# Patient Record
Sex: MALE | Race: BLACK OR AFRICAN AMERICAN | URBAN - METROPOLITAN AREA
[De-identification: names, ages, dates, MRNs, and addresses within clinical notes are randomized per-mention and may not be internally consistent; named-entity substitution may affect disease eponyms.]

---

## 2023-11-17 ENCOUNTER — TELEPHONE (OUTPATIENT)
Age: 48
End: 2023-11-17

## 2023-11-17 NOTE — TELEPHONE ENCOUNTER
Attempted to call patient and confirm appointment for 11/20/23 with Dr. Mery Becker. No answer, left a voicemail for a returned call.

## 2023-11-20 ENCOUNTER — OFFICE VISIT (OUTPATIENT)
Age: 48
End: 2023-11-20
Payer: MEDICAID

## 2023-11-20 VITALS
HEART RATE: 77 BPM | WEIGHT: 307 LBS | TEMPERATURE: 98.7 F | SYSTOLIC BLOOD PRESSURE: 150 MMHG | RESPIRATION RATE: 20 BRPM | HEIGHT: 73 IN | DIASTOLIC BLOOD PRESSURE: 83 MMHG | OXYGEN SATURATION: 98 % | BODY MASS INDEX: 40.69 KG/M2

## 2023-11-20 DIAGNOSIS — E13.9 OTHER SPECIFIED DIABETES MELLITUS WITHOUT COMPLICATION, WITH LONG-TERM CURRENT USE OF INSULIN (HCC): ICD-10-CM

## 2023-11-20 DIAGNOSIS — Z79.4 OTHER SPECIFIED DIABETES MELLITUS WITHOUT COMPLICATION, WITH LONG-TERM CURRENT USE OF INSULIN (HCC): ICD-10-CM

## 2023-11-20 DIAGNOSIS — E66.01 MORBID OBESITY (HCC): Primary | ICD-10-CM

## 2023-11-20 DIAGNOSIS — E78.5 HYPERLIPIDEMIA, UNSPECIFIED HYPERLIPIDEMIA TYPE: ICD-10-CM

## 2023-11-20 DIAGNOSIS — E66.01 MORBID OBESITY (HCC): ICD-10-CM

## 2023-11-20 PROCEDURE — 3077F SYST BP >= 140 MM HG: CPT | Performed by: SURGERY

## 2023-11-20 PROCEDURE — 3078F DIAST BP <80 MM HG: CPT | Performed by: SURGERY

## 2023-11-20 PROCEDURE — 99204 OFFICE O/P NEW MOD 45 MIN: CPT | Performed by: SURGERY

## 2023-11-20 PROCEDURE — 3052F HG A1C>EQUAL 8.0%<EQUAL 9.0%: CPT | Performed by: SURGERY

## 2023-11-20 ASSESSMENT — PATIENT HEALTH QUESTIONNAIRE - PHQ9
SUM OF ALL RESPONSES TO PHQ QUESTIONS 1-9: 15
4. FEELING TIRED OR HAVING LITTLE ENERGY: 3
7. TROUBLE CONCENTRATING ON THINGS, SUCH AS READING THE NEWSPAPER OR WATCHING TELEVISION: 3
1. LITTLE INTEREST OR PLEASURE IN DOING THINGS: 3
6. FEELING BAD ABOUT YOURSELF - OR THAT YOU ARE A FAILURE OR HAVE LET YOURSELF OR YOUR FAMILY DOWN: 0
SUM OF ALL RESPONSES TO PHQ QUESTIONS 1-9: 15
5. POOR APPETITE OR OVEREATING: 2
SUM OF ALL RESPONSES TO PHQ QUESTIONS 1-9: 15
SUM OF ALL RESPONSES TO PHQ9 QUESTIONS 1 & 2: 5
8. MOVING OR SPEAKING SO SLOWLY THAT OTHER PEOPLE COULD HAVE NOTICED. OR THE OPPOSITE, BEING SO FIGETY OR RESTLESS THAT YOU HAVE BEEN MOVING AROUND A LOT MORE THAN USUAL: 0
2. FEELING DOWN, DEPRESSED OR HOPELESS: 2
9. THOUGHTS THAT YOU WOULD BE BETTER OFF DEAD, OR OF HURTING YOURSELF: 0
3. TROUBLE FALLING OR STAYING ASLEEP: 2
SUM OF ALL RESPONSES TO PHQ QUESTIONS 1-9: 15
10. IF YOU CHECKED OFF ANY PROBLEMS, HOW DIFFICULT HAVE THESE PROBLEMS MADE IT FOR YOU TO DO YOUR WORK, TAKE CARE OF THINGS AT HOME, OR GET ALONG WITH OTHER PEOPLE: 0

## 2023-11-28 ENCOUNTER — OFFICE VISIT (OUTPATIENT)
Age: 48
End: 2023-11-28

## 2023-11-28 DIAGNOSIS — E66.01 MORBID OBESITY (HCC): Primary | ICD-10-CM

## 2023-11-28 NOTE — PROGRESS NOTES
wt loss through various methods with most successful wt loss of 50#. Has been unable to maintain long term or significant wt loss and is now seeking approval for weight loss surgery. Pt will need to complete 6 months of supervised weight loss for insurance requirements. 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 Pt knows people who have had surgery and have done well. 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 Has a refrigerator and access to keeping food while at work. NUTRITION DIAGNOSIS:  Food and nutrition related knowledge deficit r/t lack of prior exposure to information evidenced by pt seeking nutrition education in preparation for ELLIOTT. NUTRITION INTERVENTION:  Pt educated on nutrition recommendations for weight loss surgery, specifically ELLIOTT. Instructed on consuming 3 meals per day starting now. Use the balanced plate method to plan meals, include 3 oz of lean source of protein, 1/2 cup whole grains, unlimited non-starchy vegetables, 1/2 cup fruit and 1 serving of low fat dairy. Utilize handouts listing healthy snack and meal ideas to limit restaurant meals. After surgery measure all meals to 1/2 cup. Each meal will contain a 1/4 cup lean protein and 1/4 cup fruit, non-starchy vegetable or starch (limiting to once per day).  Aim for 80 -

## 2023-11-29 ENCOUNTER — HOSPITAL ENCOUNTER (OUTPATIENT)
Facility: HOSPITAL | Age: 48
Discharge: HOME | End: 2023-12-02
Attending: SURGERY
Payer: MEDICAID

## 2023-11-29 DIAGNOSIS — E66.01 MORBID OBESITY (HCC): ICD-10-CM

## 2023-11-29 PROCEDURE — 74240 X-RAY XM UPR GI TRC 1CNTRST: CPT

## 2023-12-05 LAB
25(OH)D3+25(OH)D2 SERPL-MCNC: 15.8 NG/ML (ref 30–100)
ALBUMIN SERPL-MCNC: 4.4 G/DL (ref 4.1–5.1)
ALBUMIN/GLOB SERPL: 1.6 {RATIO} (ref 1.2–2.2)
ALP SERPL-CCNC: 68 IU/L (ref 44–121)
ALT SERPL-CCNC: 13 IU/L (ref 0–44)
AST SERPL-CCNC: 13 IU/L (ref 0–40)
BASOPHILS # BLD AUTO: 0.1 X10E3/UL (ref 0–0.2)
BASOPHILS NFR BLD AUTO: 1 %
BILIRUB SERPL-MCNC: 0.3 MG/DL (ref 0–1.2)
BUN SERPL-MCNC: 19 MG/DL (ref 6–24)
BUN/CREAT SERPL: 22 (ref 9–20)
CALCIUM SERPL-MCNC: 9.4 MG/DL (ref 8.7–10.2)
CHLORIDE SERPL-SCNC: 100 MMOL/L (ref 96–106)
CHOLEST SERPL-MCNC: 243 MG/DL (ref 100–199)
CO2 SERPL-SCNC: 23 MMOL/L (ref 20–29)
CREAT SERPL-MCNC: 0.85 MG/DL (ref 0.76–1.27)
EGFRCR SERPLBLD CKD-EPI 2021: 107 ML/MIN/1.73
EOSINOPHIL # BLD AUTO: 0.1 X10E3/UL (ref 0–0.4)
EOSINOPHIL NFR BLD AUTO: 1 %
ERYTHROCYTE [DISTWIDTH] IN BLOOD BY AUTOMATED COUNT: 14.3 % (ref 11.6–15.4)
FOLATE SERPL-MCNC: 6.7 NG/ML
GLOBULIN SER CALC-MCNC: 2.7 G/DL (ref 1.5–4.5)
GLUCOSE SERPL-MCNC: 125 MG/DL (ref 70–99)
HBA1C MFR BLD: 7.7 % (ref 4.8–5.6)
HCT VFR BLD AUTO: 38.8 % (ref 37.5–51)
HDLC SERPL-MCNC: 61 MG/DL
HGB BLD-MCNC: 12.8 G/DL (ref 13–17.7)
IMM GRANULOCYTES # BLD AUTO: 0 X10E3/UL (ref 0–0.1)
IMM GRANULOCYTES NFR BLD AUTO: 0 %
IRON SATN MFR SERPL: 21 % (ref 15–55)
IRON SERPL-MCNC: 73 UG/DL (ref 38–169)
LDLC SERPL CALC-MCNC: 159 MG/DL (ref 0–99)
LYMPHOCYTES # BLD AUTO: 2.4 X10E3/UL (ref 0.7–3.1)
LYMPHOCYTES NFR BLD AUTO: 37 %
MCH RBC QN AUTO: 26 PG (ref 26.6–33)
MCHC RBC AUTO-ENTMCNC: 33 G/DL (ref 31.5–35.7)
MCV RBC AUTO: 79 FL (ref 79–97)
MONOCYTES # BLD AUTO: 0.4 X10E3/UL (ref 0.1–0.9)
MONOCYTES NFR BLD AUTO: 6 %
NEUTROPHILS # BLD AUTO: 3.5 X10E3/UL (ref 1.4–7)
NEUTROPHILS NFR BLD AUTO: 55 %
PLATELET # BLD AUTO: 268 X10E3/UL (ref 150–450)
POTASSIUM SERPL-SCNC: 4 MMOL/L (ref 3.5–5.2)
PROT SERPL-MCNC: 7.1 G/DL (ref 6–8.5)
RBC # BLD AUTO: 4.92 X10E6/UL (ref 4.14–5.8)
SODIUM SERPL-SCNC: 140 MMOL/L (ref 134–144)
TIBC SERPL-MCNC: 341 UG/DL (ref 250–450)
TRIGL SERPL-MCNC: 132 MG/DL (ref 0–149)
TSH SERPL DL<=0.005 MIU/L-ACNC: 2.25 UIU/ML (ref 0.45–4.5)
UIBC SERPL-MCNC: 268 UG/DL (ref 111–343)
VIT B12 SERPL-MCNC: 318 PG/ML (ref 232–1245)
VLDLC SERPL CALC-MCNC: 23 MG/DL (ref 5–40)
WBC # BLD AUTO: 6.5 X10E3/UL (ref 3.4–10.8)

## 2023-12-11 LAB
UREA BREATH TEST QL: NEGATIVE
VIT B1 BLD-SCNC: 87.2 NMOL/L (ref 66.5–200)

## 2023-12-21 DIAGNOSIS — E11.42 TYPE 2 DIABETES MELLITUS WITH DIABETIC POLYNEUROPATHY, WITHOUT LONG-TERM CURRENT USE OF INSULIN (HCC): ICD-10-CM

## 2023-12-26 RX ORDER — INSULIN GLARGINE 100 [IU]/ML
INJECTION, SOLUTION SUBCUTANEOUS
Qty: 15 ML | Refills: 5 | Status: SHIPPED | OUTPATIENT
Start: 2023-12-26

## 2024-01-15 ENCOUNTER — OFFICE VISIT (OUTPATIENT)
Age: 49
End: 2024-01-15

## 2024-01-15 DIAGNOSIS — E66.01 MORBID OBESITY (HCC): Primary | ICD-10-CM

## 2024-01-15 NOTE — PROGRESS NOTES
[facility]  Supervised Weight Loss     Date:   1/15/2024    Patient's Name: Miguel Carroll  : 1975    Insurance:  [employer]+                   Session: 3 of  6  Surgery: ELLIOTT                                   Surgeon:  Ravi Tobin M.D.      Height: 6' 1\"                Reported Weight:    291      Lbs.                              BMI: 38             Pounds Lost since last month: 0#               Pounds Gained since last month: 1     Starting Weight: 307#                       Previous Month’s Weight: 290#  Overall Pounds Lost: 16#                Overall Pounds Gained: 0     Other Pertinent Information: Today's appointment was completed over the phone. Today's wt was self-reported. Pt has multiple sclerosis. Currently taking vitamin D and multivitamin.      Smoking Status:  none  Alcohol Intake: none    I have reviewed with pt the guidelines of the supervised wt loss program.  Pt understands the expectations of some wt loss during the program and that wt gain could delay the process. I have also explained that appointments need to be consecutive and missing an appointment may result in starting over. Pt has received this information in writing.          Changes that patient has made since last month include:  decreased added sugar intake, mostly protein, fruits, vegetables and more complex carbohydrates, practicing not drinking with meals      Eating Habits and Behaviors  A nutrition lesson specific to vitamins was provided. We discussed the various reasons for needing vitamins and different types and doses. General healthy eating guidelines were also discussed. Pts were instructed that their plate should be made up 1/2 plate coming from non-starchy vegetables, 1/4 coming from lean meat, and 1/4 of their plate coming from carbohydrates, including fruits, starches, or milk.  We discussed measuring meals to 1/2 cup total per meal after surgery. Drinking only

## 2024-01-22 ENCOUNTER — TELEMEDICINE (OUTPATIENT)
Age: 49
End: 2024-01-22
Payer: MEDICAID

## 2024-01-22 DIAGNOSIS — F50.9 EATING DISORDER, UNSPECIFIED TYPE: ICD-10-CM

## 2024-01-22 DIAGNOSIS — E66.01 MORBID OBESITY (HCC): ICD-10-CM

## 2024-01-22 DIAGNOSIS — F33.9 RECURRENT MAJOR DEPRESSIVE DISORDER, REMISSION STATUS UNSPECIFIED (HCC): Primary | ICD-10-CM

## 2024-01-22 PROCEDURE — 90791 PSYCH DIAGNOSTIC EVALUATION: CPT | Performed by: COUNSELOR

## 2024-01-22 NOTE — PROGRESS NOTES
[facility]  Bariatric Psychosocial Evaluation - Part 1    This note will not be viewable in [facility] for the following reason(s). This is a Psychotherapy Note. (Behavorial Health Providers Only)    Date of Intake:  2024   Start Time:  10:30 AM  End Time:  12:22 PM  CPT code: 03021  Telehealth:  Yes     Name: Miguel Carroll      :  1975   Gender:  male   Marital Status:     Race/Ethnicity:  Black /    Type of Service:  06649 - Psychiatric Diagnostic Evaluation      Miguel Carroll was evaluated through a patient-initiated, synchronous (real-time) audio-visual encounter.  Patient (or guardian if applicable) is aware that it is a billable service, which includes applicable co-pays, with coverage as determined by his insurance carrier. This visit was conducted with the patient's (and/or legal guardian's) verbal consent.  The patient was located in a state where the provider was licensed to provide care.  The patient was located at: Home: [address] [place] [address]  The provider was located at: Home (Appt Dept State): [place]       REASON FOR REFERRAL:    Miguel Carroll is a 48 year-old, , , male referred by his surgeon, Dr. Ravi Tobin, to determine his appropriateness for bariatric surgery.  He is 6 feet 1 inches tall and his total weight today is 292 lbs.  The purpose of the evaluation is to assess personality, psychopathology, emotional functioning, and health behavior adherence through clinical interview and psychological testing to identify factors that might provide challenges to optimal recovery for bariatric/metabolic surgery (BMS).    HISTORY OF PRESENTING PROBLEM:    Mr. Carroll reported having struggled with his weight since the age of 27.  The patient stated that his lowest adult weight was 170 lbs. and highest adult weight was 400 lbs.  Patient endorsed the following contributors to his weight

## 2024-01-30 NOTE — PROGRESS NOTES
HTS   Gait: Unstable and drags L LE needing to put more pressure on R forearm on the walker to be able to compensate for L leg weakness    Assessment:       ICD-10-CM    1. Multiple sclerosis (HCC)  G35 MRI BRAIN W WO CONTRAST     MRI CERVICAL SPINE W WO CONTRAST     External Referral To Physical Therapy      2. Pain of right forearm  M79.631       3. Gait disturbance  R26.9         C3C5 transverse myelits    R lateral elbow pain, possible R tennis elbow due to overcompensating to use R side due to L leg weakness when walking      Timed 25 foot walk: 59 secs  EDSS 6.0    Plan:   Reviewed medical records [facility]   2. Will do MRI brain and cervical spine with DER[facility] to look for interval worsening of MS  3. Will start patient on Ampyra 10 mg BID to improve walking  4. Physical therapy referral for R tennis elbow  5. Advise R elbow brace for tennis elbow  6. Trial of Voltaren gel for R lateral elbow  7. Advise ice pack 15 mins TID to R lateral elbow  8. Patient already completed Mavenclad  9. Patient planning to get weight loss surgery. Advise to talk to PCP about new medications for diabetes and weight loss (I.e. Wegovy)         Return in about 3 months (around 4/30/2024).           Barrie Mills MD  Diplomate, American Board of Psychiatry and Neurology  Diplomate, Neuromuscular Medicine  Diplomate, American Board of Electrodiagnostic Medicine        I spent total of 40 mins with the patient, greater than 50% of the visit was spent on providing counseling and coordination of care.      Doxycycline Counseling:  Patient counseled regarding possible photosensitivity and increased risk for sunburn.  Patient instructed to avoid sunlight, if possible.  When exposed to sunlight, patients should wear protective clothing, sunglasses, and sunscreen.  The patient was instructed to call the office immediately if the following severe adverse effects occur:  hearing changes, easy bruising/bleeding, severe headache, or vision changes.  The patient verbalized understanding of the proper use and possible adverse effects of doxycycline.  All of the patient's questions and concerns were addressed.

## 2024-01-31 ENCOUNTER — OFFICE VISIT (OUTPATIENT)
Age: 49
End: 2024-01-31
Payer: MEDICAID

## 2024-01-31 VITALS
HEIGHT: 73 IN | SYSTOLIC BLOOD PRESSURE: 130 MMHG | HEART RATE: 87 BPM | OXYGEN SATURATION: 98 % | DIASTOLIC BLOOD PRESSURE: 70 MMHG | BODY MASS INDEX: 39.98 KG/M2 | TEMPERATURE: 95.3 F

## 2024-01-31 DIAGNOSIS — M79.631 PAIN OF RIGHT FOREARM: ICD-10-CM

## 2024-01-31 DIAGNOSIS — G35 MULTIPLE SCLEROSIS (HCC): Primary | ICD-10-CM

## 2024-01-31 DIAGNOSIS — R26.9 GAIT DISTURBANCE: ICD-10-CM

## 2024-01-31 PROCEDURE — 3075F SYST BP GE 130 - 139MM HG: CPT | Performed by: PSYCHIATRY & NEUROLOGY

## 2024-01-31 PROCEDURE — 99215 OFFICE O/P EST HI 40 MIN: CPT | Performed by: PSYCHIATRY & NEUROLOGY

## 2024-01-31 PROCEDURE — 3078F DIAST BP <80 MM HG: CPT | Performed by: PSYCHIATRY & NEUROLOGY

## 2024-01-31 RX ORDER — DALFAMPRIDINE 10 MG/1
10 TABLET, FILM COATED, EXTENDED RELEASE ORAL EVERY 12 HOURS
Qty: 60 TABLET | Refills: 3 | Status: ACTIVE | OUTPATIENT
Start: 2024-01-31

## 2024-02-06 ENCOUNTER — TELEMEDICINE (OUTPATIENT)
Age: 49
End: 2024-02-06
Payer: MEDICAID

## 2024-02-06 DIAGNOSIS — F33.1 MODERATE EPISODE OF RECURRENT MAJOR DEPRESSIVE DISORDER (HCC): ICD-10-CM

## 2024-02-06 DIAGNOSIS — E11.42 TYPE 2 DIABETES MELLITUS WITH DIABETIC POLYNEUROPATHY, WITHOUT LONG-TERM CURRENT USE OF INSULIN (HCC): Primary | ICD-10-CM

## 2024-02-06 DIAGNOSIS — G35 MULTIPLE SCLEROSIS (HCC): ICD-10-CM

## 2024-02-06 PROBLEM — F33.0 MILD EPISODE OF RECURRENT MAJOR DEPRESSIVE DISORDER (HCC): Status: RESOLVED | Noted: 2019-04-11 | Resolved: 2024-02-06

## 2024-02-06 PROCEDURE — G2211 COMPLEX E/M VISIT ADD ON: HCPCS | Performed by: PHYSICIAN ASSISTANT

## 2024-02-06 PROCEDURE — 99214 OFFICE O/P EST MOD 30 MIN: CPT | Performed by: PHYSICIAN ASSISTANT

## 2024-02-06 RX ORDER — GABAPENTIN 300 MG/1
300 CAPSULE ORAL 3 TIMES DAILY
Qty: 270 CAPSULE | Refills: 1 | Status: SHIPPED | OUTPATIENT
Start: 2024-02-06 | End: 2024-08-04

## 2024-02-06 ASSESSMENT — PATIENT HEALTH QUESTIONNAIRE - PHQ9
10. IF YOU CHECKED OFF ANY PROBLEMS, HOW DIFFICULT HAVE THESE PROBLEMS MADE IT FOR YOU TO DO YOUR WORK, TAKE CARE OF THINGS AT HOME, OR GET ALONG WITH OTHER PEOPLE: 0
9. THOUGHTS THAT YOU WOULD BE BETTER OFF DEAD, OR OF HURTING YOURSELF: 0
SUM OF ALL RESPONSES TO PHQ9 QUESTIONS 1 & 2: 0
1. LITTLE INTEREST OR PLEASURE IN DOING THINGS: 0
8. MOVING OR SPEAKING SO SLOWLY THAT OTHER PEOPLE COULD HAVE NOTICED. OR THE OPPOSITE, BEING SO FIGETY OR RESTLESS THAT YOU HAVE BEEN MOVING AROUND A LOT MORE THAN USUAL: 0
2. FEELING DOWN, DEPRESSED OR HOPELESS: 0
6. FEELING BAD ABOUT YOURSELF - OR THAT YOU ARE A FAILURE OR HAVE LET YOURSELF OR YOUR FAMILY DOWN: 0
4. FEELING TIRED OR HAVING LITTLE ENERGY: 0
SUM OF ALL RESPONSES TO PHQ QUESTIONS 1-9: 0
7. TROUBLE CONCENTRATING ON THINGS, SUCH AS READING THE NEWSPAPER OR WATCHING TELEVISION: 0
SUM OF ALL RESPONSES TO PHQ QUESTIONS 1-9: 0
SUM OF ALL RESPONSES TO PHQ QUESTIONS 1-9: 0
3. TROUBLE FALLING OR STAYING ASLEEP: 0
SUM OF ALL RESPONSES TO PHQ QUESTIONS 1-9: 0
5. POOR APPETITE OR OVEREATING: 0

## 2024-02-06 ASSESSMENT — ENCOUNTER SYMPTOMS
COUGH: 0
SHORTNESS OF BREATH: 0

## 2024-02-06 NOTE — PROGRESS NOTES
I have reviewed all needed documentation in preparation for visit. Verified patient by name and date of birth  Chief Complaint   Patient presents with    Med Change Request     wants to increase medication on gabapentin-         There were no vitals filed for this visit.    Health Maintenance Due   Topic Date Due    Hepatitis B vaccine (1 of 3 - 3-dose series) Never done    DTaP/Tdap/Td vaccine (1 - Tdap) Never done    Diabetic retinal exam  02/13/2015    Diabetic foot exam  09/03/2022    Pneumococcal 0-64 years Vaccine (2 - PCV) 09/17/2022    COVID-19 Vaccine (2 - 2023-24 season) 09/01/2023       1. \"Have you been to the ER, urgent care clinic since your last visit?  Hospitalized since your last visit?\" NO    2. \"Have you seen or consulted any other health care providers outside of the [facility] since your last visit?\" No     3. For patients aged 45-75: Has the patient had a colonoscopy / FIT/ Cologuard? Yes - no Care Gap present      If the patient is female:    4. For patients aged 40-74: Has the patient had a mammogram within the past 2 years? NA - based on age or sex      5. For patients aged 21-65: Has the patient had a pap smear? NA - based on age or sex        Ivonne xiao [facility]

## 2024-02-06 NOTE — PROGRESS NOTES
Miguel Carroll is a 48 y.o. male who was seen by synchronous (real-time) audio-video technology on 2/6/2024    Consent: Miguel Carroll, who was seen by synchronous (real-time) audio-video technology, and/or his healthcare decision maker, is aware that this patient-initiated, Telehealth encounter on 2/6/2024 is a billable service, with coverage as determined by his insurance carrier. He is aware that he may receive a bill and has provided verbal consent to proceed: YES  Subjective:   Miguel Carroll is a 48 y.o. male who was seen for Med Change Request (wants to increase medication on gabapentin-  )    DM II - controlled. Started Gabapentin for Neuropathy and depression last month, No side effects but no improvement in Neuropathy or MS pain.   Seeing Neurology now for MS and hopeful about new tx options.     Hemoglobin A1C (%)   Date Value   12/04/2023 7.7 (H)     LDL Calculated (mg/dL)   Date Value   12/04/2023 159 (H)     Creatinine (mg/dL)   Date Value   12/04/2023 0.85     Currently taking:   Key Antihyperglycemic Medications            insulin glargine (BASAGLAR KWIKPEN) 100 UNIT/ML injection pen (Taking)    Sig: Inject 50 units under the skin every morning for type 2 diabetes mellitus.          Review of Systems   Constitutional:  Negative for fever.   Respiratory:  Negative for cough and shortness of breath.    Cardiovascular:  Negative for chest pain and palpitations.   Neurological:  Negative for headaches.   Psychiatric/Behavioral:  Negative for dysphoric mood.      Objective:         2/6/2024     9:42 AM   Patient-Reported Vitals   Patient-Reported Weight 294lb   Patient-Reported Height 6'1   Patient-Reported Temperature 94.4      General: alert, cooperative, no distress   Mental  status: normal mood, behavior, speech, dress, motor activity, and thought processes, able to follow commands   HENT: NCAT   Neck: no visualized mass   Resp: no respiratory distress   Neuro: no gross deficits   Skin: no

## 2024-02-12 ENCOUNTER — OFFICE VISIT (OUTPATIENT)
Age: 49
End: 2024-02-12

## 2024-02-12 DIAGNOSIS — E66.01 MORBID OBESITY (HCC): Primary | ICD-10-CM

## 2024-02-12 NOTE — PROGRESS NOTES
[facility]  Supervised Weight Loss     Date:   2024    Patient's Name: Miguel Carroll  : 1975    Insurance:  [employer] +                   Session: 4 of  6  Surgery: ELLIOTT                                   Surgeon:  Ravi Tobin M.D.      Height: 6' 1\"                Reported Weight:    288      Lbs.                              BMI: 37             Pounds Lost since last month: 3#               Pounds Gained since last month: 0     Starting Weight: 307#                       Previous Month’s Weight: 291#  Overall Pounds Lost: 19#                Overall Pounds Gained: 0     Other Pertinent Information: Today's appointment was completed over the phone. Today's wt was self-reported. Pt has multiple sclerosis. Stopped using medical marijuana d/t increased Gabapentin per PCP.     Smoking Status:  none  Alcohol Intake: none    I have reviewed with pt the guidelines of the supervised wt loss program.  Pt understands the expectations of some wt loss during the program and that wt gain could delay the process. I have also explained that appointments need to be consecutive and missing an appointment may result in starting over. Pt has received this information in writing.          Changes that patient has made since last month include:  maintaining previously made changes, increased water intake (50-64 oz per day), practicing not drinking with meals ()      Eating Habits and Behaviors  General healthy eating guidelines were also discussed. Pts were instructed that their plate should be made up 1/2 plate coming from non-starchy vegetables, 1/4 coming from lean meat, and 1/4 of their plate coming from carbohydrates, including fruits, starches, or milk.  We discussed measuring meals to 1/2 cup total per meal after surgery. Drinking only calorie-free, sugar-free and non-carbonated beverages. We discussed the importance of drinking 64 ounces of fluid per day to

## 2024-02-20 ENCOUNTER — TELEMEDICINE (OUTPATIENT)
Age: 49
End: 2024-02-20
Payer: MEDICAID

## 2024-02-20 DIAGNOSIS — F50.9 EATING DISORDER, UNSPECIFIED TYPE: ICD-10-CM

## 2024-02-20 DIAGNOSIS — F33.9 RECURRENT MAJOR DEPRESSIVE DISORDER, REMISSION STATUS UNSPECIFIED (HCC): Primary | ICD-10-CM

## 2024-02-20 DIAGNOSIS — E66.01 MORBID OBESITY (HCC): ICD-10-CM

## 2024-02-20 PROCEDURE — 90832 PSYTX W PT 30 MINUTES: CPT | Performed by: COUNSELOR

## 2024-02-20 NOTE — PROGRESS NOTES
[facility]  Psychosocial Evaluation Part 2 - Mental Health Progress Note    This note will not be viewable in [facility] for the following reason(s). This is a Psychotherapy Note. (Behavorial Health Providers Only)    Name: Miguel Carroll  : 1975  MRN: [id number]  Date of Service: 2024  Location of Service: [place] for both provider and patient  Type of Service:  Individual Therapy (Virtual Visit)  Start Time:  3:32 PM   End Time:   4:05 PM  CPT code: 38757      Miguel Carroll was evaluated through a patient-initiated, synchronous (real-time) audio-visual encounter.  Patient (or guardian if applicable) is aware that it is a billable service, which includes applicable co-pays, with coverage as determined by his insurance carrier. This visit was conducted with the patient's (and/or legal guardian's) verbal consent.  The patient was located in a state where the provider was licensed to provide care.  The patient was located at: Home: [address] [place] [address]  The provider was located at: [place]t Dept [place]        ICD-10-CM      1. Recurrent major depressive disorder, remission status unspecified (HCC)  F33.9         2. Eating disorder, unspecified type  F50.9         3. Morbid obesity (HCC)  E66.01             Reason for Visit:  Follow-up to Bariatric Evaluation - to determine appropriateness for bariatric surgery from a psychosocial perspective with requirements and/or recommendations.  Following the Bariatric Psychosocial Evaluation - Part 2, a formal copy of the Bariatric Psychosocial Evaluation will be scanned into Epic EHR.       Subjective (patient report):  Patient acknowledged taking medication and denied any side-effects.  Did not endorse any depression, anxiety, valdemar or psychotic symptoms.      Objective (factual account of what was observed, mental health status):  Mental Health Status: Patient was dressed properly. No abnormal psychomotor

## 2024-02-21 ENCOUNTER — HOSPITAL ENCOUNTER (OUTPATIENT)
Facility: HOSPITAL | Age: 49
Discharge: HOME | End: 2024-02-24
Attending: PSYCHIATRY & NEUROLOGY
Payer: MEDICAID

## 2024-02-21 DIAGNOSIS — G35 MULTIPLE SCLEROSIS (HCC): ICD-10-CM

## 2024-02-21 PROCEDURE — 72156 MRI NECK SPINE W/O & W/DYE: CPT

## 2024-02-21 PROCEDURE — 70553 MRI BRAIN STEM W/O & W/DYE: CPT

## 2024-02-21 PROCEDURE — 6360000004 HC RX CONTRAST MEDICATION: Performed by: RADIOLOGY

## 2024-02-21 PROCEDURE — A9579 GAD-BASE MR CONTRAST NOS,1ML: HCPCS | Performed by: RADIOLOGY

## 2024-02-21 RX ADMIN — GADOTERIDOL 20 ML: 279.3 INJECTION, SOLUTION INTRAVENOUS at 18:36

## 2024-03-11 ENCOUNTER — OFFICE VISIT (OUTPATIENT)
Age: 49
End: 2024-03-11

## 2024-03-11 ENCOUNTER — TELEPHONE (OUTPATIENT)
Age: 49
End: 2024-03-11

## 2024-03-11 DIAGNOSIS — E66.01 MORBID OBESITY (HCC): Primary | ICD-10-CM

## 2024-03-11 NOTE — PROGRESS NOTES
[facility] at [facility]  Supervised Weight Loss     Date:   3/11/2024    Patient's Name: Miguel Carroll  : 1975    Insurance:  [employer]+                   Session: 5 of  6  Surgery: ELLIOTT                                   Surgeon:  Ravi Tobin M.D.      Height: 6' 1\"                Reported Weight:    290      Lbs.                              BMI: 37             Pounds Lost since last month: 0#               Pounds Gained since last month: 2#     Starting Weight: 307#                       Previous Month’s Weight: 288#  Overall Pounds Lost: 17#                Overall Pounds Gained: 0     Other Pertinent Information: Today's appointment was completed over the phone. Today's wt was self-reported. Pt has multiple sclerosis.     Smoking Status:  none  Alcohol Intake: none    I have reviewed with pt the guidelines of the supervised wt loss program.  Pt understands the expectations of some wt loss during the program and that wt gain could delay the process. I have also explained that appointments need to be consecutive and missing an appointment may result in starting over. Pt has received this information in writing.          Changes that patient has made since last month include:  maintaining previously made changes (protein focused meals, drinking mostly water), purchased portion control set, practicing 30/30 rule      Eating Habits and Behaviors  General healthy eating guidelines were discussed. A nutrition lesson was presented on portion control. Patients were instructed implement portion control now using the balanced plate method (1/2 plate non-starchy vegetables, 1/4 plate lean meat, and 1/4 plate whole grains and to include fruit and/or milk at meals or snack). We discussed measuring meals to 1/2 cup total per meal after surgery and appropriate portion progression long term.                       Patient's current diet habits include: Pt is eating 3 meals per day.

## 2024-03-11 NOTE — TELEPHONE ENCOUNTER
Patient called because they are concerned that their appt. to see Dr. Tobin is too soon since they have not completed all nutritional visits as required by the insurance. The appt. with Dr. Tobin is in March and the last appt. with the dietician is in April. I advised I will send this message to Iman and she will follow up.

## 2024-03-11 NOTE — TELEPHONE ENCOUNTER
Identified patient with two patient identifiers (name and ). Reviewed chart in preparation for encounter and have obtained necessary documentation.     Called and spoke with patient, scheduled him for 3/21/24 at 2:00 PM with Anuradha Lovett for his [place] Review.

## 2024-03-18 ENCOUNTER — TELEMEDICINE (OUTPATIENT)
Age: 49
End: 2024-03-18
Payer: MEDICAID

## 2024-03-18 DIAGNOSIS — G35 MULTIPLE SCLEROSIS (HCC): ICD-10-CM

## 2024-03-18 DIAGNOSIS — F33.1 MODERATE EPISODE OF RECURRENT MAJOR DEPRESSIVE DISORDER (HCC): ICD-10-CM

## 2024-03-18 DIAGNOSIS — E11.42 TYPE 2 DIABETES MELLITUS WITH DIABETIC POLYNEUROPATHY, WITHOUT LONG-TERM CURRENT USE OF INSULIN (HCC): ICD-10-CM

## 2024-03-18 PROCEDURE — G2211 COMPLEX E/M VISIT ADD ON: HCPCS | Performed by: PHYSICIAN ASSISTANT

## 2024-03-18 PROCEDURE — 99214 OFFICE O/P EST MOD 30 MIN: CPT | Performed by: PHYSICIAN ASSISTANT

## 2024-03-18 RX ORDER — GABAPENTIN 400 MG/1
800 CAPSULE ORAL 3 TIMES DAILY
Qty: 540 CAPSULE | Refills: 1 | Status: SHIPPED | OUTPATIENT
Start: 2024-03-18 | End: 2024-09-14

## 2024-03-18 RX ORDER — GABAPENTIN 300 MG/1
600 CAPSULE ORAL 3 TIMES DAILY
Qty: 540 CAPSULE | Refills: 1 | Status: SHIPPED | OUTPATIENT
Start: 2024-03-18 | End: 2024-03-18

## 2024-03-18 ASSESSMENT — ENCOUNTER SYMPTOMS
SHORTNESS OF BREATH: 0
COUGH: 0

## 2024-03-18 NOTE — PROGRESS NOTES
I have reviewed all needed documentation in preparation for visit. Verified patient by name and date of birth.  Pt reports they are physically in the [facility].  Virtual link sent to pt via verified text/email    OR    Pt elected to connect through [facility]    Chief Complaint   Patient presents with    Other     Wants to discuss Rx, no reported vitals this morning, vitals from yesterday on [facility]       \"Have you been to the ER, urgent care clinic since your last visit?  Hospitalized since your last visit?\"    NO    “Have you seen or consulted any other health care providers outside of [facility] since your last visit?”    NO          Click Here for Release of Records Request    There were no vitals filed for this visit.    Health Maintenance Due   Topic Date Due    Hepatitis B vaccine (1 of 3 - 3-dose series) Never done    DTaP/Tdap/Td vaccine (1 - Tdap) Never done    Diabetic retinal exam  02/13/2015    Diabetic foot exam  09/03/2022    Pneumococcal 0-64 years Vaccine (2 of 2 - PCV) 09/17/2022    COVID-19 Vaccine (2 - 2023-24 season) 09/01/2023       Hayde Rizo LPN

## 2024-03-18 NOTE — PROGRESS NOTES
Miguel Carroll is a 48 y.o. male  Chief Complaint   Patient presents with    Other     Wants to discuss Rx, no reported vitals this morning, vitals from yesterday on MyChart     Wt Readings from Last 3 Encounters:   12/18/23 (!) 137.4 kg (303 lb)   11/20/23 (!) 139.3 kg (307 lb)   09/25/23 135.4 kg (298 lb 6.4 oz)     BMI Readings from Last 3 Encounters:   01/31/24 39.98 kg/m²   12/18/23 39.98 kg/m²   11/20/23 40.50 kg/m²     Health Maintenance Due   Topic Date Due    Hepatitis B vaccine (1 of 3 - 3-dose series) Never done    DTaP/Tdap/Td vaccine (1 - Tdap) Never done    Diabetic retinal exam  02/13/2015    Diabetic foot exam  09/03/2022    Pneumococcal 0-64 years Vaccine (2 of 2 - PCV) 09/17/2022    COVID-19 Vaccine (2 - 2023-24 season) 09/01/2023     HPI  Encounter Diagnoses   Name Primary?    Type 2 diabetes mellitus with diabetic polyneuropathy, without long-term current use of insulin (HCC)     Multiple sclerosis (HCC)     Moderate episode of recurrent major depressive disorder (HCC)      Pt notes some improvement in pain & depression with tapering up as directed to 600 mg TID, but still has room for improvement.     ROS  Review of Systems   Constitutional:  Negative for fever.   Respiratory:  Negative for cough and shortness of breath.    Cardiovascular:  Negative for chest pain and palpitations.   Neurological:  Negative for headaches.   Psychiatric/Behavioral:  Negative for dysphoric mood.      EXAM  Physical Exam  Vitals and nursing note reviewed.   Constitutional:       Appearance: Normal appearance.   HENT:      Head: Normocephalic and atraumatic.   Neck:      Vascular: No carotid bruit.   Cardiovascular:      Rate and Rhythm: Normal rate and regular rhythm.      Heart sounds: Normal heart sounds.   Pulmonary:      Effort: Pulmonary effort is normal. No respiratory distress.      Breath sounds: Normal breath sounds.   Musculoskeletal:      Cervical back: Neck supple.   Skin:     General: Skin is warm

## 2024-03-21 ENCOUNTER — TELEPHONE (OUTPATIENT)
Age: 49
End: 2024-03-21

## 2024-03-21 ENCOUNTER — OFFICE VISIT (OUTPATIENT)
Age: 49
End: 2024-03-21
Payer: MEDICAID

## 2024-03-21 VITALS
OXYGEN SATURATION: 97 % | HEART RATE: 93 BPM | TEMPERATURE: 98.3 F | BODY MASS INDEX: 40.21 KG/M2 | RESPIRATION RATE: 16 BRPM | WEIGHT: 303.4 LBS | HEIGHT: 73 IN | DIASTOLIC BLOOD PRESSURE: 93 MMHG | SYSTOLIC BLOOD PRESSURE: 141 MMHG

## 2024-03-21 DIAGNOSIS — E55.9 VITAMIN D DEFICIENCY: ICD-10-CM

## 2024-03-21 DIAGNOSIS — G35 MULTIPLE SCLEROSIS (HCC): ICD-10-CM

## 2024-03-21 DIAGNOSIS — E66.01 MORBID OBESITY WITH BMI OF 40.0-44.9, ADULT (HCC): Primary | ICD-10-CM

## 2024-03-21 DIAGNOSIS — E11.42 TYPE 2 DIABETES MELLITUS WITH DIABETIC POLYNEUROPATHY, WITHOUT LONG-TERM CURRENT USE OF INSULIN (HCC): ICD-10-CM

## 2024-03-21 DIAGNOSIS — E78.00 PURE HYPERCHOLESTEROLEMIA, UNSPECIFIED: ICD-10-CM

## 2024-03-21 PROCEDURE — 99214 OFFICE O/P EST MOD 30 MIN: CPT | Performed by: NURSE PRACTITIONER

## 2024-03-21 PROCEDURE — 3080F DIAST BP >= 90 MM HG: CPT | Performed by: NURSE PRACTITIONER

## 2024-03-21 PROCEDURE — 3077F SYST BP >= 140 MM HG: CPT | Performed by: NURSE PRACTITIONER

## 2024-03-21 RX ORDER — DALFAMPRIDINE 10 MG/1
10 TABLET, FILM COATED, EXTENDED RELEASE ORAL EVERY 12 HOURS
Qty: 180 TABLET | Refills: 1 | Status: ACTIVE | OUTPATIENT
Start: 2024-03-21

## 2024-03-21 ASSESSMENT — PATIENT HEALTH QUESTIONNAIRE - PHQ9
SUM OF ALL RESPONSES TO PHQ QUESTIONS 1-9: 0
SUM OF ALL RESPONSES TO PHQ9 QUESTIONS 1 & 2: 0
SUM OF ALL RESPONSES TO PHQ QUESTIONS 1-9: 0
1. LITTLE INTEREST OR PLEASURE IN DOING THINGS: NOT AT ALL
SUM OF ALL RESPONSES TO PHQ QUESTIONS 1-9: 0
2. FEELING DOWN, DEPRESSED OR HOPELESS: NOT AT ALL
SUM OF ALL RESPONSES TO PHQ QUESTIONS 1-9: 0

## 2024-03-21 NOTE — TELEPHONE ENCOUNTER
----- Message from Mikayla Hendricks LPN sent at 3/21/2024  8:44 AM EDT -----  Regarding: FW: Dalfampridine    ----- Message -----  From: Tanja Daiz  Sent: 3/20/2024   2:54 PM EDT  To: Janel Page; Mikayla Hendricks LPN  Subject: Dalfampridine                                    Can you please resend the Dalfampridine script to [facility]?  Patient wanted to use [facility] pharmacy but they are not able to bill to his plan.    Thanks!  Tanja

## 2024-03-22 ASSESSMENT — ENCOUNTER SYMPTOMS
BACK PAIN: 1
CHOKING: 0
CHEST TIGHTNESS: 0

## 2024-03-22 NOTE — PATIENT INSTRUCTIONS
Talk with WEST Flores about getting the Dexcom monitor your blood sugars.    Continue your work with the dietitian    Great job getting prepared    Keep up the exercise    Goal A1c is less than 8% proceed with surgery.      [facility] weight management center Support Group schedule 2024     Zoom link to join: [email or web address]     Or join by phone: Dial [phone] and enter Meeting  [phone]     Questions about Support Group - email [email or web address]      The [facility] Weight Management virtual support group is a monthly meeting designed to provide additional support during your weight loss journey. These sessions are open to all patients of the [facility] Weight Management Center including surgical and non-surgical weight loss patients. Sign up is not required.     Support Group meets the 2nd Thursday of every month from 6:00 - 7:00 pm      Date  Topic  Facilitator  Presenter(s)    January 11  Exercise: Cardio & Strength  BHAVIN Grewal, [facility] Prep Director    February 8  Healthy Eating on a Budget  Nita Nichole,     Brii Jenkins, MS, RD, LDN    March 14  Food and Mood  [facility]   Radha Chen LPC, NCC, CCTP    April 11  Weight Loss Medications  Brii Jenkins MS, RD, LDN  Elizabeth Pettit MD, AB, DAB    May 9  Ask the Doc: Q&A with Bariatric Surgeons  JAMES Peace D.O.   Bariatric Surgeon    June 13  Summer Cooking Demo  BHAVIN Huynh RD    July 11  Stress Management and Eating Patterns  Jaymie Bess, N.P.  Radha Chen LPC, NCC, CCTP    August 8  Exercise and Shortness of Breath  Iman Aguilar,   Bariatric Patient Care Coordinator  Eboni Brnad, MS, ACSM EP MINA Tran EP   [facility] Exercise Physiologists    September 12  Gut Health and GI Issues  JAMES Peace, N.P.    October 10  Body Image as Your Body Changes  Nita

## 2024-03-27 ENCOUNTER — TELEPHONE (OUTPATIENT)
Age: 49
End: 2024-03-27

## 2024-03-27 NOTE — TELEPHONE ENCOUNTER
Pharmacy would like a call back as soon as possible on the following medication      Continuous Glucose Monitor Sup KIT     More information is needed.

## 2024-03-28 NOTE — TELEPHONE ENCOUNTER
Attempted to call pt    No answer, left message for return call and informed pt a message has been left on [employer]    Message on [place] as follows;    Magdalena Rivera,  Your pharmacy contacted us with an information request regarding your Rx request for a glucose meter.   They need to verify which glucose meter system was intended and that your insurance company will cover  Please call and let us know so we can request the meter and the supplemental supplies  Thank you  MARCIA Richardson LPN

## 2024-04-03 ENCOUNTER — OFFICE VISIT (OUTPATIENT)
Age: 49
End: 2024-04-03
Payer: MEDICAID

## 2024-04-03 DIAGNOSIS — F33.9 RECURRENT MAJOR DEPRESSIVE DISORDER, REMISSION STATUS UNSPECIFIED (HCC): Primary | ICD-10-CM

## 2024-04-03 DIAGNOSIS — F50.9 EATING DISORDER, UNSPECIFIED TYPE: ICD-10-CM

## 2024-04-03 DIAGNOSIS — E66.01 MORBID OBESITY (HCC): ICD-10-CM

## 2024-04-03 PROCEDURE — 90853 GROUP PSYCHOTHERAPY: CPT | Performed by: COUNSELOR

## 2024-04-03 NOTE — PROGRESS NOTES
will send it to them and they need to fill this out and send them back by Monday.     Treatment Plan  Goal #1:  Reduce Emotional Eating   Objective #1:  Learning to keep a food diary, learning stress management skills, developing healthy behaviors and identifying difference between physical and emotional hunger.     Plans:  Continue to use Cognitive Behavioral Therapy and Dialectical Behavioral Therapy  Continue to work with patient on primary goal  Continue to see patient for counseling to address behavioral changes, if patient decides that they would like to seek supportive counseling         Signature: Radha Chen LPC, NCC, CCTP      Date:  4/3/2024

## 2024-04-11 ENCOUNTER — OFFICE VISIT (OUTPATIENT)
Age: 49
End: 2024-04-11

## 2024-04-11 ENCOUNTER — TELEPHONE (OUTPATIENT)
Age: 49
End: 2024-04-11

## 2024-04-11 DIAGNOSIS — E66.01 MORBID OBESITY (HCC): Primary | ICD-10-CM

## 2024-04-11 NOTE — TELEPHONE ENCOUNTER
Identified patient with two patient identifiers (name and ). Reviewed chart in preparation for encounter and have obtained necessary documentation.     Called and spoke with patient regarding next steps. He is aware he has a few more appointments with Radha and once he's cleared we can schedule him for his final review. Patient understood what was discussed and was thankful.

## 2024-04-11 NOTE — PROGRESS NOTES
[facility] at [facility]  Supervised Weight Loss     Date:   2024    Patient's Name: Miguel Carroll  : 1975    Insurance:  [employer]                   Session:   Surgery: ELLIOTT                                   Surgeon:  Ravi Tobin M.D.      Height: 6' 1\"                Reported Weight:    297      Lbs.                              BMI: 39             Pounds Lost since last month: 0#               Pounds Gained since last month: 7#     Starting Weight: 307#                       Previous Month’s Weight: 290#  Overall Pounds Lost: 10#                  Overall Pounds Gained: 0     Other Pertinent Information: Today's appointment was completed over the phone. Today's wt was self-reported. Pt has multiple sclerosis.    Smoking Status:  none for the past 3 months   Alcohol Intake: none for the past 68 days     I have reviewed with pt the guidelines of the supervised wt loss program.  Pt understands the expectations of some wt loss during the program and that wt gain could delay the process. I have also explained that appointments need to be consecutive and missing an appointment may result in starting over. Pt has received this information in writing.          Changes that patient has made since last month include:  drinking 64 oz water, using protein shakes, increased protein and vegetable intake, tracking intake food and fluid intake.      Eating Habits and Behaviors  General healthy eating guidelines were also discussed. Pts were instructed that their plate should be made up 1/2 plate coming from non-starchy vegetables, 1/4 coming from lean meat, and 1/4 of their plate coming from carbohydrates, including fruits, starches, or milk. We discussed measuring meals to 1/2 cup total per meal after surgery. Drinking only calorie-free, sugar-free and non-carbonated beverages. We discussed the importance of drinking 64 ounces of fluid per day to prevent dehydration

## 2024-04-17 ENCOUNTER — OFFICE VISIT (OUTPATIENT)
Age: 49
End: 2024-04-17

## 2024-04-17 DIAGNOSIS — E66.01 MORBID OBESITY (HCC): ICD-10-CM

## 2024-04-17 DIAGNOSIS — F50.9 EATING DISORDER, UNSPECIFIED TYPE: ICD-10-CM

## 2024-04-17 DIAGNOSIS — F33.9 RECURRENT MAJOR DEPRESSIVE DISORDER, REMISSION STATUS UNSPECIFIED (HCC): Primary | ICD-10-CM

## 2024-04-17 NOTE — PROGRESS NOTES
[facility]  Group Therapy Progress Note    This note will not be viewable in [place] for the following reason(s). This is a Psychotherapy Note. (Behavorial Health Providers Only)    Name: Mallorie Carroll  : 1975  MRN: [id number]  Date of Service: 2024  Location of Service: [place] for both provider and patient  Type of Service:  Group Therapy (Virtual Visit)  Start Time:  1:30 PM   End Time:   2:40 PM  CPT code: 67384      Mallorie Carroll was evaluated through a patient-initiated, synchronous (real-time) audio-visual encounter.  Patient (or guardian if applicable) is aware that it is a billable service, which includes applicable co-pays, with coverage as determined by his insurance carrier. This visit was conducted with the patient's (and/or legal guardian's) verbal consent.  The patient was located in a state where the provider was licensed to provide care.  The patient was located at: Home: [address] [place] [address]  The provider was located at: Work [place] State[place]        ICD-10-CM      1. Recurrent major depressive disorder, remission status unspecified (HCC)  F33.9         2. Eating disorder, unspecified type  F50.9         3. Morbid obesity (HCC)  E66.01             Reason for Visit:  GROUP PSYCHOTHERAPY TO ADDRESS UNHEALTHY EATING PATTERNS:    2/2 REQUIRED COUNSELING SESSIONS    MALLORIE HAS COMPLETED ALL REQUIRED SESSIONS FOR BARIATRIC SURGERY AND IS NOW APPROVED FOR SURGERY FROM A PSYCHOSOCIAL PERSPECTIVE.     Subjective (patient report):  Patient acknowledged taking medication and denied any side-effects.  Did not endorse any depression, anxiety, valdemar or psychotic symptoms.     This patient participated in group therapy today.  He presented with positive mood and engaged in the group when appropriate.  He stated that his current goals were to 1) increase his protein and 2) increase his exercise.  He stated that in his younger years he used to

## 2024-04-18 ENCOUNTER — TELEMEDICINE (OUTPATIENT)
Age: 49
End: 2024-04-18

## 2024-04-18 VITALS — HEIGHT: 73 IN | WEIGHT: 291 LBS | BODY MASS INDEX: 38.57 KG/M2

## 2024-04-18 DIAGNOSIS — E66.01 SEVERE OBESITY (BMI 35.0-39.9) WITH COMORBIDITY (HCC): Primary | ICD-10-CM

## 2024-04-18 ASSESSMENT — PATIENT HEALTH QUESTIONNAIRE - PHQ9
SUM OF ALL RESPONSES TO PHQ QUESTIONS 1-9: 2
SUM OF ALL RESPONSES TO PHQ QUESTIONS 1-9: 2
SUM OF ALL RESPONSES TO PHQ9 QUESTIONS 1 & 2: 2
SUM OF ALL RESPONSES TO PHQ QUESTIONS 1-9: 2
2. FEELING DOWN, DEPRESSED OR HOPELESS: SEVERAL DAYS
1. LITTLE INTEREST OR PLEASURE IN DOING THINGS: SEVERAL DAYS
SUM OF ALL RESPONSES TO PHQ QUESTIONS 1-9: 2

## 2024-04-22 NOTE — PROGRESS NOTES
Chief Complaint   Patient presents with    Weight Management     [facility] Bariat[facility]     Miguel Carroll was previously seen by me for midway bariatric appointment.  He is in process for SAD-I with Dr. Tobin.  He is done very well and continues to lose weight on his presurgery regimen.  He and his wife continued meal prep he is as active as he can be.  Labs, x-rays and workup today that were reviewed with him and his midway appointment.  Was not quite clear why he had this appointment today and neither was he.  He has no questions or concerns today he just wants to continue to progress toward surgery.    Ht 1.854 m (6' 1\")   Wt 132 kg (291 lb)   BMI 38.39 kg/m²   Last Weight Metrics:      4/18/2024     2:15 PM 3/21/2024     2:15 PM 1/31/2024     9:10 AM 12/18/2023     1:54 PM 11/20/2023    11:05 AM 9/25/2023     9:09 AM 9/11/2023     9:29 AM   Weight Loss Metrics   Height 6' 1\" 6' 1\" 6' 1\" 6' 1\" 6' 1\"  6' 1\"   Weight - Scale 291 lbs 303 lbs 6 oz  303 lbs 307 lbs 298 lbs 6 oz 310 lbs   BMI (Calculated) 38.5 kg/m2 40.1 kg/m2  40.1 kg/m2 40.6 kg/m2 0 kg/m2 41 kg/m2        Diagnosis Orders   1. Severe obesity (BMI 35.0-39.9) with comorbidity (HCC)          He is completed his nutrition appointments   He has 1 more psychological evaluation  Is done his labs as instructed, lost weight and continues to work on behavioral changes.  Recommend proceeding with SAD-I per Dr. Tobin's recommendations.  I will follow-up with Dr. Tobin as the patient is ready to submit for insurance approval.

## 2024-04-24 ENCOUNTER — OFFICE VISIT (OUTPATIENT)
Age: 49
End: 2024-04-24
Payer: MEDICAID

## 2024-04-24 DIAGNOSIS — F50.9 EATING DISORDER, UNSPECIFIED TYPE: ICD-10-CM

## 2024-04-24 DIAGNOSIS — E66.01 MORBID OBESITY (HCC): ICD-10-CM

## 2024-04-24 DIAGNOSIS — F33.9 RECURRENT MAJOR DEPRESSIVE DISORDER, REMISSION STATUS UNSPECIFIED (HCC): Primary | ICD-10-CM

## 2024-04-24 PROCEDURE — 90853 GROUP PSYCHOTHERAPY: CPT | Performed by: COUNSELOR

## 2024-04-24 NOTE — PROGRESS NOTES
[facility]  Group Therapy Progress Note    This note will not be viewable in [employer] for the following reason(s). This is a Psychotherapy Note. (Behavorial Health Providers Only)    Name: Mallorie Carroll  : 1975  MRN: [id number]  Date of Service: 2024  Location of Service: [place] for both provider and patient  Type of Service:  Group Therapy (Virtual Visit)  Start Time:  1:40 PM   End Time:   2:35 PM  CPT code: 60107      Mallorie Carroll was evaluated through a patient-initiated, synchronous (real-time) audio-visual encounter.  Patient (or guardian if applicable) is aware that it is a billable service, which includes applicable co-pays, with coverage as determined by his insurance carrier. This visit was conducted with the patient's (and/or legal guardian's) verbal consent.  The patient was located in a state where the provider was licensed to provide care.  The patient was located at: Home: [address] [place] [address]  The provider was located at: [place]        ICD-10-CM      1. Recurrent major depressive disorder, remission status unspecified (HCC)  F33.9         2. Eating disorder, unspecified type  F50.9         3. Morbid obesity (HCC)  E66.01             Reason for Visit:  GROUP PSYCHOTHERAPY TO ADDRESS UNHEALTHY EATING PATTERNS:    3/2 REQUIRED COUNSELING SESSIONS    MALLORIE HAS COMPLETED ALL REQUIRED SESSIONS FOR BARIATRIC SURGERY AND IS NOW APPROVED FOR SURGERY FROM A PSYCHOSOCIAL PERSPECTIVE.     Subjective (patient report):  Patient acknowledged taking medication and denied any side-effects.  Did not endorse any depression, anxiety, valdemar or psychotic symptoms.     This patient participated in group therapy today.  He presented with positive mood and engaged in the group when appropriate.  He stated he continues to make progress on his current goals to 1) increase his protein and 2) increase his exercise.  He shared that his

## 2024-05-01 ENCOUNTER — CLINICAL DOCUMENTATION (OUTPATIENT)
Age: 49
End: 2024-05-01

## 2024-05-01 ENCOUNTER — OFFICE VISIT (OUTPATIENT)
Age: 49
End: 2024-05-01
Payer: MEDICAID

## 2024-05-01 VITALS
SYSTOLIC BLOOD PRESSURE: 138 MMHG | OXYGEN SATURATION: 97 % | DIASTOLIC BLOOD PRESSURE: 70 MMHG | RESPIRATION RATE: 18 BRPM | TEMPERATURE: 98.1 F | HEART RATE: 99 BPM | BODY MASS INDEX: 41.35 KG/M2 | WEIGHT: 312 LBS | HEIGHT: 73 IN

## 2024-05-01 VITALS
BODY MASS INDEX: 38.39 KG/M2 | HEIGHT: 73 IN | DIASTOLIC BLOOD PRESSURE: 80 MMHG | SYSTOLIC BLOOD PRESSURE: 140 MMHG | HEART RATE: 91 BPM | OXYGEN SATURATION: 97 %

## 2024-05-01 VITALS
BODY MASS INDEX: 41.48 KG/M2 | WEIGHT: 313 LBS | HEIGHT: 73 IN | TEMPERATURE: 98.8 F | OXYGEN SATURATION: 94 % | HEART RATE: 98 BPM | RESPIRATION RATE: 18 BRPM | DIASTOLIC BLOOD PRESSURE: 84 MMHG | SYSTOLIC BLOOD PRESSURE: 139 MMHG

## 2024-05-01 DIAGNOSIS — M25.50 MULTIPLE JOINT PAIN: ICD-10-CM

## 2024-05-01 DIAGNOSIS — E66.01 MORBID (SEVERE) OBESITY DUE TO EXCESS CALORIES (HCC): ICD-10-CM

## 2024-05-01 DIAGNOSIS — M77.11 RIGHT LATERAL EPICONDYLITIS: ICD-10-CM

## 2024-05-01 DIAGNOSIS — M54.41 CHRONIC RIGHT-SIDED LOW BACK PAIN WITH RIGHT-SIDED SCIATICA: Primary | ICD-10-CM

## 2024-05-01 DIAGNOSIS — E66.01 MORBID OBESITY (HCC): Primary | ICD-10-CM

## 2024-05-01 DIAGNOSIS — G35 MULTIPLE SCLEROSIS (HCC): Primary | ICD-10-CM

## 2024-05-01 DIAGNOSIS — G89.29 CHRONIC RIGHT-SIDED LOW BACK PAIN WITH RIGHT-SIDED SCIATICA: Primary | ICD-10-CM

## 2024-05-01 DIAGNOSIS — F33.1 MODERATE EPISODE OF RECURRENT MAJOR DEPRESSIVE DISORDER (HCC): ICD-10-CM

## 2024-05-01 DIAGNOSIS — I10 ESSENTIAL HYPERTENSION: ICD-10-CM

## 2024-05-01 DIAGNOSIS — G35 MULTIPLE SCLEROSIS (HCC): ICD-10-CM

## 2024-05-01 DIAGNOSIS — E11.42 TYPE 2 DIABETES MELLITUS WITH DIABETIC POLYNEUROPATHY, WITHOUT LONG-TERM CURRENT USE OF INSULIN (HCC): ICD-10-CM

## 2024-05-01 DIAGNOSIS — R26.9 GAIT DISTURBANCE: ICD-10-CM

## 2024-05-01 PROCEDURE — 3079F DIAST BP 80-89 MM HG: CPT | Performed by: SURGERY

## 2024-05-01 PROCEDURE — G2211 COMPLEX E/M VISIT ADD ON: HCPCS | Performed by: PHYSICIAN ASSISTANT

## 2024-05-01 PROCEDURE — 3075F SYST BP GE 130 - 139MM HG: CPT | Performed by: SURGERY

## 2024-05-01 PROCEDURE — 3078F DIAST BP <80 MM HG: CPT | Performed by: PHYSICIAN ASSISTANT

## 2024-05-01 PROCEDURE — 99214 OFFICE O/P EST MOD 30 MIN: CPT | Performed by: PHYSICIAN ASSISTANT

## 2024-05-01 PROCEDURE — 99214 OFFICE O/P EST MOD 30 MIN: CPT | Performed by: SURGERY

## 2024-05-01 PROCEDURE — 3077F SYST BP >= 140 MM HG: CPT | Performed by: PSYCHIATRY & NEUROLOGY

## 2024-05-01 PROCEDURE — 99215 OFFICE O/P EST HI 40 MIN: CPT | Performed by: PSYCHIATRY & NEUROLOGY

## 2024-05-01 PROCEDURE — 3079F DIAST BP 80-89 MM HG: CPT | Performed by: PSYCHIATRY & NEUROLOGY

## 2024-05-01 PROCEDURE — 3075F SYST BP GE 130 - 139MM HG: CPT | Performed by: PHYSICIAN ASSISTANT

## 2024-05-01 RX ORDER — METOPROLOL SUCCINATE 25 MG/1
25 TABLET, EXTENDED RELEASE ORAL DAILY
Qty: 90 TABLET | Refills: 1 | Status: SHIPPED | OUTPATIENT
Start: 2024-05-01

## 2024-05-01 RX ORDER — GABAPENTIN 600 MG/1
1200 TABLET ORAL 3 TIMES DAILY
Qty: 540 TABLET | Refills: 1 | Status: SHIPPED | OUTPATIENT
Start: 2024-05-01 | End: 2024-10-28

## 2024-05-01 RX ORDER — LIDOCAINE, MENTHOL 4.8; 1.2 G/120G; G/120G
GEL ORAL
COMMUNITY

## 2024-05-01 ASSESSMENT — PATIENT HEALTH QUESTIONNAIRE - PHQ9
SUM OF ALL RESPONSES TO PHQ QUESTIONS 1-9: 0
1. LITTLE INTEREST OR PLEASURE IN DOING THINGS: NOT AT ALL
SUM OF ALL RESPONSES TO PHQ QUESTIONS 1-9: 0
2. FEELING DOWN, DEPRESSED OR HOPELESS: NOT AT ALL
SUM OF ALL RESPONSES TO PHQ9 QUESTIONS 1 & 2: 0

## 2024-05-01 ASSESSMENT — ENCOUNTER SYMPTOMS
SHORTNESS OF BREATH: 0
COUGH: 0
BACK PAIN: 1

## 2024-05-01 NOTE — PROGRESS NOTES
Miguel Carroll is a 48 y.o. male  Chief Complaint   Patient presents with    Medication Check     No concerns      Vitals:    05/01/24 1118   BP: 138/70   Pulse:    Resp:    Temp:    SpO2:       Wt Readings from Last 3 Encounters:   05/01/24 (!) 141.5 kg (312 lb)   04/18/24 132 kg (291 lb)   03/21/24 (!) 137.6 kg (303 lb 6.4 oz)     BMI Readings from Last 3 Encounters:   05/01/24 41.16 kg/m²   05/01/24 38.39 kg/m²   04/18/24 38.39 kg/m²     Health Maintenance Due   Topic Date Due    Hepatitis B vaccine (1 of 3 - 3-dose series) Never done    DTaP/Tdap/Td vaccine (1 - Tdap) Never done    Diabetic retinal exam  02/13/2015    Diabetic foot exam  09/03/2022    Pneumococcal 0-64 years Vaccine (2 of 2 - PCV) 09/17/2022    COVID-19 Vaccine (2 - 2023-24 season) 09/01/2023     HPI  Hx MS with Severe joint pain. Neuro is ordering a spine MRI soon. No improvement with increasing Gabapentin dose.     Reports that depression feels controlled.     CV follow up  BP borderline controlled:  BP Readings from Last 3 Encounters:   05/01/24 138/70   05/01/24 (!) 140/80   03/21/24 (!) 141/93     Currently taking:   Hypertension Medications       Calcium Channel Blockers       amLODIPine (NORVASC) 10 MG tablet Take 1 tablet by mouth daily       Thiazides and Thiazide-Like Diuretics       hydroCHLOROthiazide (HYDRODIURIL) 25 MG tablet Take 1 tablet by mouth daily        Angiotensin II Receptor Antagonists       olmesartan (BENICAR) 40 MG tablet Take 1 tablet by mouth daily            Creatinine (mg/dL)   Date Value   12/04/2023 0.85      Cholesterol Meds  atorvastatin - 40 MG  LDL Calculated   Date Value Ref Range Status   12/04/2023 159 (H) 0 - 99 mg/dL Final     DM II - controlled. Feeling well.    Hemoglobin A1C (%)   Date Value   12/04/2023 7.7 (H)     Lab Results   Component Value Date    CHOL 243 (H) 12/04/2023    TRIG 132 12/04/2023    HDL 61 12/04/2023    CHOLHDLRATIO 3.9 09/14/2022     Creatinine (mg/dL)   Date Value

## 2024-05-01 NOTE — PROGRESS NOTES
Identified patient with two patient identifiers (name and ). Reviewed chart in preparation for visit and have obtained necessary documentation.    Miguel Carroll is a 48 y.o. male  No chief complaint on file.    /84 (Site: Right Upper Arm, Position: Sitting, Cuff Size: Medium Adult)   Pulse 98   Temp 98.8 °F (37.1 °C) (Oral)   Resp 18   Ht 1.854 m (6' 1\")   Wt (!) 142 kg (313 lb)   SpO2 94%   BMI 41.30 kg/m²     1. Have you been to the ER, urgent care clinic since your last visit?  Hospitalized since your last visit?no    2. Have you seen or consulted any other health care providers outside of the [facility] since your last visit?  Include any pap smears or colon screening. no  
include but are not limited to failed weight loss, weight regain, malnutrition, leak, bleed, stricture, gastric ulcer, gastric fistula, gastric bleed, esophageal injury, gallstones, new or worsening gastric reflux, nausea, emesis, internal hernia, abdominal wall hernia, gastric perforation, need for revision / conversion / or reversal, pregnancy complications and loss, intestinal ischemia, post operative skin complications, possible thinning of their hair, bowel obstruction, dumping syndrome, wound infection, blood clots (DVT, mesenteric thrombus, pulmonary embolism), increased addictive tendency, risk of anesthesia, MI, stroke, and death.  The patient has read through and has signed the comprehensive consent process for bariatric / revisional surgery.  This has been signed by me as well. They expressed complete understanding and had no further questions.    The patient understands this is a life altering decision and will require compliance to the program for the remainder of their life in order to be monitored to avoid complication and ensure successful, sustained weight control. They will be placed on a lifelong low carbohydrate and low sugar diet, exercise, and vitamin regimen and will require frequent blood draws and office visits to ensure adequate nutrition and program compliance. Visits and follow up will be in compliance with the guidelines set forth by MBSAQ[employer]. I have specifically mentioned the need to avoid all personal and second-hand tobacco exposure, systemic steroids, and NSAIDS after any bariatric surgery to help avoid the above listed complications. The patient has expressed understanding of the above and would like to proceed with surgery.      Signed By: Ravi Tobin MD, FACS, [facility]  Bariatric and General Surgeon  [facility]    May 1, 2024

## 2024-05-01 NOTE — PROGRESS NOTES
I have reviewed all needed documentation in preparation for visit. Verified patient by name and date of birth  Chief Complaint   Patient presents with    Medication Check     No concerns        Vitals:    05/01/24 1053   BP: (!) 150/79   Site: Right Upper Arm   Position: Sitting   Cuff Size: Medium Adult   Pulse: 99   Resp: 18   Temp: 98.1 °F (36.7 °C)   TempSrc: Temporal   SpO2: 97%   Weight: (!) 141.5 kg (312 lb)   Height: 1.854 m (6' 1\")       Health Maintenance Due   Topic Date Due    Hepatitis B vaccine (1 of 3 - 3-dose series) Never done    DTaP/Tdap/Td vaccine (1 - Tdap) Never done    Diabetic retinal exam  02/13/2015    Diabetic foot exam  09/03/2022    Pneumococcal 0-64 years Vaccine (2 of 2 - PCV) 09/17/2022    COVID-19 Vaccine (2 - 2023-24 season) 09/01/2023     \"Have you been to the ER, urgent care clinic since your last visit?  Hospitalized since your last visit?\"    NO    “Have you seen or consulted any other health care providers outside of [facility] since your last visit?”    NO          Click Here for Release of Records Request         Ivonne xiao CMA

## 2024-05-01 NOTE — PROGRESS NOTES
Neurology Progress Note    Patient ID:  Miguel Carroll  [id number]  48 y.o.  1975      Subjective:   History:  Mr. Carroll with R knee surgery, diabetes, BPH, HTN, hypercholesterol, obesity previously seen by Dr Reich for MS. Completed [facility] in - . Currently not on medication for MS. Baseline has numbness of both lower extremities and some weakness of both legs needing to use a walker. For the past 1 yr, patient noted R forearm pain. Takes Gabapentin 100 mg QHS and Cymbalta 60 mg QD c/o PCP. Takes Vit D3.       Patient was started on Ampyra 10 mg BID with increase speed of walking.    However, patient continues to suffer from R lower back pain radiating to R leg and drags R leg.     Patient started wearing R elbow compression and putting ice on R lateral elbow with improvement of R lateral elbow pain. Did not go to physical therapy.    Planning to get weight loss surgery.      ROS:  Per HPI-  Otherwise the remainder of ROS was negative    Social Hx  Social History     Socioeconomic History    Marital status:    Tobacco Use    Smoking status: Former     Current packs/day: 0.00     Average packs/day: 0.5 packs/day for 7.0 years (3.5 ttl pk-yrs)     Types: Cigarettes     Start date:      Quit date: 2010     Years since quittin.3    Smokeless tobacco: Former     Quit date: 2009   Substance and Sexual Activity    Alcohol use: Not Currently     Comment: q3 weeks 2 drinks    Drug use: Yes     Frequency: 14.0 times per week     Types: Marijuana (Weed)    Sexual activity: Yes     Partners: Female   Social History Narrative    Worked as a  x 12 years prior to changing career    Works in [employer] for [employer]      In the home with spouse and 4 children ages 21-12 (all well)     Social Determinants of Health     Financial Resource Strain: High Risk (2023)    Overall Financial Resource Strain (CARDIA)     Difficulty of Paying Living Expenses: Hard   Food Insecurity:

## 2024-05-01 NOTE — PROGRESS NOTES
Submitted clinicals to [facility] via [facility] for ROBOTIC SLEEVE GASTRECTOMY SINGLE ANASTOMOSIS DUODENAL ILEOSTOMY  pre auth for Dr Tobin.

## 2024-05-07 ENCOUNTER — TELEPHONE (OUTPATIENT)
Age: 49
End: 2024-05-07

## 2024-05-07 ENCOUNTER — PREP FOR PROCEDURE (OUTPATIENT)
Age: 49
End: 2024-05-07

## 2024-05-07 DIAGNOSIS — E66.01 MORBID OBESITY (HCC): ICD-10-CM

## 2024-05-07 NOTE — TELEPHONE ENCOUNTER
Spoke to patient to let him know his surgery letter has posted to his my chart and will go out in the mail. I reviewed the diet and info class that that won't show up in his my chart.  I informed him to be on the look out for an e-mail from [employer] in your junk/spam folder.  I explained what will be in the e-mail and to please reply to it so we know you received it.  He acknowledged ok and thank you

## 2024-05-07 NOTE — TELEPHONE ENCOUNTER
Spoke to patient,  I offered 6/27 for surgery and he accepted.  I let him know that I will be scheduling his per op appointments which are Diet and infor class, PAT and H&P.  That is any of these appointments are a no show or rescheduled it could push his surgery out.  he understood.  I also let him know that I will schedule his post op appointment and that once everything is scheduled I will put in all in a letter with dates, times and if they are virtual or in person.  [facility] and I will mail it out.  I will also call to let you know when it has been posted.  I let Him know he should should hear from me by end of day today

## 2024-05-08 DIAGNOSIS — E66.01 MORBID OBESITY (HCC): ICD-10-CM

## 2024-05-08 RX ORDER — SODIUM CHLORIDE 0.9 % (FLUSH) 0.9 %
5-40 SYRINGE (ML) INJECTION EVERY 12 HOURS SCHEDULED
Status: CANCELLED | OUTPATIENT
Start: 2024-05-08

## 2024-05-08 RX ORDER — SODIUM CHLORIDE, SODIUM LACTATE, POTASSIUM CHLORIDE, CALCIUM CHLORIDE 600; 310; 30; 20 MG/100ML; MG/100ML; MG/100ML; MG/100ML
INJECTION, SOLUTION INTRAVENOUS CONTINUOUS
Status: CANCELLED | OUTPATIENT
Start: 2024-05-08

## 2024-05-08 RX ORDER — SODIUM CHLORIDE 9 MG/ML
INJECTION, SOLUTION INTRAVENOUS PRN
Status: CANCELLED | OUTPATIENT
Start: 2024-05-08

## 2024-05-08 RX ORDER — GABAPENTIN 250 MG/5ML
500 SOLUTION ORAL
Status: CANCELLED | OUTPATIENT
Start: 2024-05-08

## 2024-05-08 RX ORDER — SODIUM CHLORIDE 0.9 % (FLUSH) 0.9 %
5-40 SYRINGE (ML) INJECTION PRN
Status: CANCELLED | OUTPATIENT
Start: 2024-05-08

## 2024-05-08 RX ORDER — ACETAMINOPHEN 160 MG/5ML
1000 LIQUID ORAL
Status: CANCELLED | OUTPATIENT
Start: 2024-05-08

## 2024-05-08 RX ORDER — SCOLOPAMINE TRANSDERMAL SYSTEM 1 MG/1
1 PATCH, EXTENDED RELEASE TRANSDERMAL
Status: CANCELLED | OUTPATIENT
Start: 2024-05-08 | End: 2024-05-11

## 2024-05-13 LAB
ALBUMIN SERPL-MCNC: 4.3 G/DL (ref 4.1–5.1)
ALBUMIN/GLOB SERPL: 1.7 {RATIO} (ref 1.2–2.2)
ALP SERPL-CCNC: 79 IU/L (ref 44–121)
ALT SERPL-CCNC: 23 IU/L (ref 0–44)
AST SERPL-CCNC: 19 IU/L (ref 0–40)
BILIRUB SERPL-MCNC: 0.3 MG/DL (ref 0–1.2)
BUN SERPL-MCNC: 15 MG/DL (ref 6–24)
BUN/CREAT SERPL: 17 (ref 9–20)
CALCIUM SERPL-MCNC: 9.9 MG/DL (ref 8.7–10.2)
CHLORIDE SERPL-SCNC: 102 MMOL/L (ref 96–106)
CO2 SERPL-SCNC: 23 MMOL/L (ref 20–29)
CREAT SERPL-MCNC: 0.9 MG/DL (ref 0.76–1.27)
EGFRCR SERPLBLD CKD-EPI 2021: 105 ML/MIN/1.73
ERYTHROCYTE [DISTWIDTH] IN BLOOD BY AUTOMATED COUNT: 13.7 % (ref 11.6–15.4)
GLOBULIN SER CALC-MCNC: 2.5 G/DL (ref 1.5–4.5)
GLUCOSE SERPL-MCNC: 130 MG/DL (ref 70–99)
HCT VFR BLD AUTO: 40.6 % (ref 37.5–51)
HGB BLD-MCNC: 13.2 G/DL (ref 13–17.7)
MCH RBC QN AUTO: 25.7 PG (ref 26.6–33)
MCHC RBC AUTO-ENTMCNC: 32.5 G/DL (ref 31.5–35.7)
MCV RBC AUTO: 79 FL (ref 79–97)
PLATELET # BLD AUTO: 275 X10E3/UL (ref 150–450)
POTASSIUM SERPL-SCNC: 4.4 MMOL/L (ref 3.5–5.2)
PROT SERPL-MCNC: 6.8 G/DL (ref 6–8.5)
RBC # BLD AUTO: 5.14 X10E6/UL (ref 4.14–5.8)
SODIUM SERPL-SCNC: 139 MMOL/L (ref 134–144)
WBC # BLD AUTO: 6.6 X10E3/UL (ref 3.4–10.8)

## 2024-06-01 DIAGNOSIS — G35 MULTIPLE SCLEROSIS (HCC): ICD-10-CM

## 2024-06-01 DIAGNOSIS — F33.1 MODERATE EPISODE OF RECURRENT MAJOR DEPRESSIVE DISORDER (HCC): ICD-10-CM

## 2024-06-03 RX ORDER — DULOXETIN HYDROCHLORIDE 60 MG/1
60 CAPSULE, DELAYED RELEASE ORAL DAILY
Qty: 90 CAPSULE | Refills: 1 | Status: SHIPPED | OUTPATIENT
Start: 2024-06-03

## 2024-06-03 NOTE — TELEPHONE ENCOUNTER
Refill request received from [facility]      for   Requested Prescriptions     Pending Prescriptions Disp Refills    DULoxetine (CYMBALTA) 60 MG extended release capsule [Pharmacy Med Name: Duloxetine 60mg Delayed-Release Capsule] 90 capsule 0     Sig: Take 1 capsule by mouth daily.     Last office visit: 5/1/2024   Next office visit: 8/2/2024     Routed to [place] for review.     Hayde Rizo LPN

## 2024-06-05 ENCOUNTER — HOSPITAL ENCOUNTER (OUTPATIENT)
Age: 49
Discharge: HOME | End: 2024-06-08
Payer: MEDICAID

## 2024-06-05 ENCOUNTER — HOSPITAL ENCOUNTER (OUTPATIENT)
Facility: HOSPITAL | Age: 49
Discharge: HOME | End: 2024-06-08
Payer: MEDICAID

## 2024-06-05 VITALS
HEART RATE: 90 BPM | RESPIRATION RATE: 20 BRPM | BODY MASS INDEX: 40.85 KG/M2 | WEIGHT: 308.2 LBS | SYSTOLIC BLOOD PRESSURE: 141 MMHG | HEIGHT: 73 IN | DIASTOLIC BLOOD PRESSURE: 89 MMHG | TEMPERATURE: 98 F

## 2024-06-05 DIAGNOSIS — E66.01 MORBID OBESITY (HCC): ICD-10-CM

## 2024-06-05 LAB
BASOPHILS # BLD: 0 K/UL (ref 0–0.1)
BASOPHILS NFR BLD: 1 % (ref 0–1)
DIFFERENTIAL METHOD BLD: ABNORMAL
EKG ATRIAL RATE: 84 BPM
EKG DIAGNOSIS: NORMAL
EKG P AXIS: 39 DEGREES
EKG P-R INTERVAL: 144 MS
EKG Q-T INTERVAL: 356 MS
EKG QRS DURATION: 96 MS
EKG QTC CALCULATION (BAZETT): 420 MS
EKG R AXIS: 65 DEGREES
EKG T AXIS: 30 DEGREES
EKG VENTRICULAR RATE: 84 BPM
EOSINOPHIL # BLD: 0 K/UL (ref 0–0.4)
EOSINOPHIL NFR BLD: 0 % (ref 0–7)
ERYTHROCYTE [DISTWIDTH] IN BLOOD BY AUTOMATED COUNT: 13.3 % (ref 11.5–14.5)
HCT VFR BLD AUTO: 40.5 % (ref 36.6–50.3)
HGB BLD-MCNC: 12.9 G/DL (ref 12.1–17)
IMM GRANULOCYTES # BLD AUTO: 0 K/UL (ref 0–0.04)
IMM GRANULOCYTES NFR BLD AUTO: 0 % (ref 0–0.5)
LYMPHOCYTES # BLD: 1.2 K/UL (ref 0.8–3.5)
LYMPHOCYTES NFR BLD: 24 % (ref 12–49)
MCH RBC QN AUTO: 25.5 PG (ref 26–34)
MCHC RBC AUTO-ENTMCNC: 31.9 G/DL (ref 30–36.5)
MCV RBC AUTO: 80.2 FL (ref 80–99)
MONOCYTES # BLD: 0.5 K/UL (ref 0–1)
MONOCYTES NFR BLD: 11 % (ref 5–13)
NEUTS SEG # BLD: 3.2 K/UL (ref 1.8–8)
NEUTS SEG NFR BLD: 64 % (ref 32–75)
NRBC # BLD: 0 K/UL (ref 0–0.01)
NRBC BLD-RTO: 0 PER 100 WBC
PLATELET # BLD AUTO: 240 K/UL (ref 150–400)
PMV BLD AUTO: 11.1 FL (ref 8.9–12.9)
RBC # BLD AUTO: 5.05 M/UL (ref 4.1–5.7)
WBC # BLD AUTO: 5 K/UL (ref 4.1–11.1)

## 2024-06-05 PROCEDURE — 36415 COLL VENOUS BLD VENIPUNCTURE: CPT

## 2024-06-05 PROCEDURE — 93005 ELECTROCARDIOGRAM TRACING: CPT | Performed by: PHYSICIAN ASSISTANT

## 2024-06-05 PROCEDURE — 82652 VIT D 1 25-DIHYDROXY: CPT

## 2024-06-05 PROCEDURE — 71046 X-RAY EXAM CHEST 2 VIEWS: CPT

## 2024-06-05 PROCEDURE — 85025 COMPLETE CBC W/AUTO DIFF WBC: CPT

## 2024-06-06 ENCOUNTER — TELEPHONE (OUTPATIENT)
Age: 49
End: 2024-06-06

## 2024-06-06 LAB — 1,25(OH)2D3 SERPL-MCNC: 59.1 PG/ML (ref 24.8–81.5)

## 2024-06-06 NOTE — TELEPHONE ENCOUNTER
Called patient to reschedule his 6/18/24 appt w/ Dr. Tobin, due to Mahad being in the OR. Patient needs to be rescheduled for another day.

## 2024-06-07 NOTE — PERIOP NOTE
[facility] CALLED  [phone] EXT [phone] TO INQUIRE ABOUT CXR. XRAY DONE BUT NOT READ-THEY WILL NOTIFY DOCTOR TO READ.     
AIDEN IN THE OR CALLED MADE AWARE OF PATIENTS WEIGHT.  
CALLED [employer]  [phone] , PATIENT HAD CXR DONE YESTERDAY BUT IT HAS NOT BEEN READ YET, PHONE NUMBER GIVEN TO CONTACT [facility]  [phone] [phone] , TO OBTAIN RESULTS.    CALLED [facility] AND WAS TOLD THEY SHOULD BE READ TODAY WITH RESULTS IN CC  
clean, dry towel. Do not apply lotions or moisturizer.  Put on clean clothes and sleep on fresh bed sheets and do not allow pets to sleep with you.    Shower with CHG soap 2 times before your surgery  The evening before your surgery  The morning of your surgery      Tips to help prevent infections after your surgery:  Protect your surgical wound from germs:  Hand washing is the most important thing you and your caregivers can do to prevent infections.  Keep your bandage clean and dry!  Do not touch your surgical wound.  Use clean, freshly washed towels and wash cloths every time you shower; do not share bath linens with others.  Until your surgical wound is healed, wear clothing and sleep on bed linens that are clean.   Do not allow pets to sleep in your bed with you or touch your surgical wound.  Do not smoke - smoking delays wound healing. This may be a good time to stop smoking.  If you have diabetes, it is important for you to manage your blood sugar levels properly before your surgery as well as after your surgery. Poorly managed blood sugar levels slow down wound healing and prevent you from healing completely.       Follow all instructions so your surgery won’t be cancelled.  Please, be on time.                    If a situation occurs and you are delayed the day of surgery, call [phone]/ [phone].    If your physical condition changes (like a fever, cold, flu, etc.) call your surgeon as soon as possible.    Home medication(s) reviewed and verified via during PAT appointment/call.    The patient was contacted in person.     He verbalized understanding of all instructions does not need reinforcement.

## 2024-06-12 ENCOUNTER — TELEMEDICINE (OUTPATIENT)
Age: 49
End: 2024-06-12

## 2024-06-12 VITALS — WEIGHT: 305 LBS | BODY MASS INDEX: 40.42 KG/M2 | HEIGHT: 73 IN

## 2024-06-12 DIAGNOSIS — E11.42 TYPE 2 DIABETES MELLITUS WITH DIABETIC POLYNEUROPATHY, WITHOUT LONG-TERM CURRENT USE OF INSULIN (HCC): ICD-10-CM

## 2024-06-12 DIAGNOSIS — E78.00 PURE HYPERCHOLESTEROLEMIA, UNSPECIFIED: ICD-10-CM

## 2024-06-12 DIAGNOSIS — E66.01 MORBID OBESITY (HCC): Primary | ICD-10-CM

## 2024-06-12 DIAGNOSIS — I10 ESSENTIAL HYPERTENSION: ICD-10-CM

## 2024-06-12 PROCEDURE — 99024 POSTOP FOLLOW-UP VISIT: CPT | Performed by: NURSE PRACTITIONER

## 2024-06-12 RX ORDER — ONDANSETRON 4 MG/1
4 TABLET, FILM COATED ORAL EVERY 8 HOURS PRN
Qty: 30 TABLET | Refills: 0 | Status: SHIPPED | OUTPATIENT
Start: 2024-06-12

## 2024-06-12 RX ORDER — OMEPRAZOLE 40 MG/1
40 CAPSULE, DELAYED RELEASE ORAL
Qty: 90 CAPSULE | Refills: 1 | Status: SHIPPED | OUTPATIENT
Start: 2024-06-12

## 2024-06-12 RX ORDER — POLYETHYLENE GLYCOL 3350 17 G/17G
17 POWDER, FOR SOLUTION ORAL DAILY
Qty: 1530 G | Refills: 0 | Status: SHIPPED | OUTPATIENT
Start: 2024-06-12 | End: 2024-09-10

## 2024-06-12 ASSESSMENT — PATIENT HEALTH QUESTIONNAIRE - PHQ9
SUM OF ALL RESPONSES TO PHQ9 QUESTIONS 1 & 2: 0
SUM OF ALL RESPONSES TO PHQ QUESTIONS 1-9: 0
1. LITTLE INTEREST OR PLEASURE IN DOING THINGS: NOT AT ALL
SUM OF ALL RESPONSES TO PHQ QUESTIONS 1-9: 0
2. FEELING DOWN, DEPRESSED OR HOPELESS: NOT AT ALL
SUM OF ALL RESPONSES TO PHQ QUESTIONS 1-9: 0
SUM OF ALL RESPONSES TO PHQ QUESTIONS 1-9: 0

## 2024-06-12 ASSESSMENT — PAIN SCALES - GENERAL: PAINLEVEL_OUTOF10: 0

## 2024-06-12 NOTE — PROGRESS NOTES
Ht 1.854 m (6' 1\")   Wt (!) 138.3 kg (305 lb)   BMI 40.24 kg/m²       Plan:   1/2. Morbid Obesity- Patient is schedule for ELLIOTT with Dr.Craig Tobin on 6/27/2024 at [facility]. Counseled patient in regard to post diet restrictions, follow- up office visits, follow- up care. Patient as received educational booklet and vitamin list. Reviewed liver shrinking diet. Start date for Liver shrinking diet 6/13/2024  ERAS protocol reviewed:  Acetaminophen 1000 mg at noon and 8 pm day before surgery and may have clear liquids (no caffeine, no ETOH, no carbonation and calorie free) until 3 hours prior to surgery   Preadmission testing results reviewed with patient   Post operative prescriptions sent to pharmacy on file for AFTER surgery:    Acid suppression Prilosec 40 mg  x 30 days, then reassess need    Antiemetic Zofran 4 mg every 8 hours as needed for nausea #30   Antispasmodic levsin 0.125 mg every 6 hours as needed for cramping.   Laxative:  Miralax 1 packet every day   DVT prophylaxis:      early ambulation and mechanical compression, non-smoker for at least 90 days   Post op pain management:  He will resume his Gabapentin postoperatively. ; acetaminophen 1000 mg po q 8 hours prn; abdominal support / splinting; heating pad prn   Reviewed with patient that lifelong vitamin supplementation is recommended by provider as well as risks of non-compliance.    3. DM/Hypercholesterolemia/HTN- Follow up with PCP 4 weeks postoperatively    Miguel Carroll, was evaluated through a synchronous (real-time) audio-video encounter. The patient (or guardian if applicable) is aware that this is a billable service, which includes applicable co-pays. This Virtual Visit was conducted with patient's (and/or legal guardian's) consent. Patient identification was verified, and a caregiver was present when appropriate.   The patient was located at Home: [address] [place] [address]  Provider was located at Facility

## 2024-06-12 NOTE — PROGRESS NOTES
Identified patient with two patient identifiers (name and ). Reviewed chart in preparation for visit and have obtained necessary documentation.    Miguel Carroll is a 48 y.o. male  Chief Complaint   Patient presents with    H&P     Scheduled for ELLIOTT on 24 with Dr Ravi Tobin     Ht 1.854 m (6' 1\")   Wt (!) 138.3 kg (305 lb)   BMI 40.24 kg/m²     1. Have you been to the ER, urgent care clinic since your last visit?  Hospitalized since your last visit?no    2. Have you seen or consulted any other health care providers outside of the [facility] since your last visit?  Include any pap smears or colon screening. no

## 2024-06-19 ENCOUNTER — OFFICE VISIT (OUTPATIENT)
Age: 49
End: 2024-06-19
Payer: MEDICAID

## 2024-06-19 VITALS
SYSTOLIC BLOOD PRESSURE: 150 MMHG | DIASTOLIC BLOOD PRESSURE: 84 MMHG | OXYGEN SATURATION: 96 % | TEMPERATURE: 98.9 F | HEIGHT: 73 IN | BODY MASS INDEX: 40.42 KG/M2 | HEART RATE: 112 BPM | WEIGHT: 305 LBS | RESPIRATION RATE: 20 BRPM

## 2024-06-19 DIAGNOSIS — E66.01 MORBID OBESITY (HCC): Primary | ICD-10-CM

## 2024-06-19 PROCEDURE — 3079F DIAST BP 80-89 MM HG: CPT | Performed by: SURGERY

## 2024-06-19 PROCEDURE — 3077F SYST BP >= 140 MM HG: CPT | Performed by: SURGERY

## 2024-06-19 PROCEDURE — 99213 OFFICE O/P EST LOW 20 MIN: CPT | Performed by: SURGERY

## 2024-06-19 ASSESSMENT — PATIENT HEALTH QUESTIONNAIRE - PHQ9
SUM OF ALL RESPONSES TO PHQ QUESTIONS 1-9: 0
SUM OF ALL RESPONSES TO PHQ QUESTIONS 1-9: 0
2. FEELING DOWN, DEPRESSED OR HOPELESS: NOT AT ALL
SUM OF ALL RESPONSES TO PHQ QUESTIONS 1-9: 0
SUM OF ALL RESPONSES TO PHQ9 QUESTIONS 1 & 2: 0
SUM OF ALL RESPONSES TO PHQ QUESTIONS 1-9: 0
1. LITTLE INTEREST OR PLEASURE IN DOING THINGS: NOT AT ALL

## 2024-06-19 NOTE — PROGRESS NOTES
Surgery Progress Note    6/19/2024    CC: Preoperative visit    Subjective:     Patient preparing for surgery next week.  He keeps losing excellent weight.  On the preoperative diet.  Vitamins are ordered.  Has food prepared at home.    Constitutional: No fever or chills  Neurologic: No headache  Eyes: No scleral icterus or irritated eyes  Nose: No nasal pain or drainage  Mouth: No oral lesions or sore throat  Cardiac: No palpations or chest pain  Pulmonary: No cough or shortness of breath  Gastrointestinal: No nausea, emesis, diarrhea, or constipation  Genitourinary: No dysuria  Musculoskeletal: No muscle or joint tenderness  Skin: No rashes or lesions  Psychiatric: No anxiety or depressed mood    Objective:     Vitals:    06/19/24 1600   BP: (!) 150/84   Pulse:    Resp:    Temp:    SpO2:      Body mass index is 40.24 kg/m².  Wt 305 lbs    General: No acute distress, conversant  Eyes: PERRLA, no scleral icterus  HENT: Normocephalic without oral lesions  Neck: Trachea midline without LAD  Cardiac: Normal pulse rate and rhythm  Pulmonary: Symmetric chest rise with normal effort  GI: Soft, morbidly obese, NT, ND, no hernia, no splenomegaly  Skin: Warm without rash  Extremities: No edema or joint stiffness  Psych: Appropriate mood and affect    Assessment:     48-year-old male preparing for surgery next week    Plan:     Once again discussed the risks and benefits of surgery including bleeding, infection, leak.  Plan for 2 days in the hospital.  Discussed the need for long-term vitamin compliance especially with the ELLIOTT.  Discussed the need for dietary and exercise compliance.  Patient expressed understanding of all the above.  I will see him next week.      Ravi Tobin MD, FACS, [facility]  Bariatric and General Surgeon  [facility]

## 2024-06-19 NOTE — PROGRESS NOTES
Identified patient with two patient identifiers (name and ). Reviewed chart in preparation for visit and have obtained necessary documentation.    Miguel Carroll is a 48 y.o. male  No chief complaint on file.    BP (!) 150/84   Pulse (!) 112   Temp 98.9 °F (37.2 °C) (Oral)   Resp 20   Ht 1.854 m (6' 1\")   Wt (!) 138.3 kg (305 lb)   SpO2 96%   BMI 40.24 kg/m²     1. Have you been to the ER, urgent care clinic since your last visit?  Hospitalized since your last visit?no    2. Have you seen or consulted any other health care providers outside of the [facility] since your last visit?  Include any pap smears or colon screening. No    Patient and provider of elevated BP X 2. Patient asymptomatic. Patient reminded to monitor BP, continue to take BP medications if prescribed, and follow up with PCP/Cardiologist.  Patient expressed understanding and agreement.

## 2024-06-19 NOTE — H&P (VIEW-ONLY)
Surgery Progress Note    6/19/2024    CC: Preoperative visit    Subjective:     Patient preparing for surgery next week.  He keeps losing excellent weight.  On the preoperative diet.  Vitamins are ordered.  Has food prepared at home.    Constitutional: No fever or chills  Neurologic: No headache  Eyes: No scleral icterus or irritated eyes  Nose: No nasal pain or drainage  Mouth: No oral lesions or sore throat  Cardiac: No palpations or chest pain  Pulmonary: No cough or shortness of breath  Gastrointestinal: No nausea, emesis, diarrhea, or constipation  Genitourinary: No dysuria  Musculoskeletal: No muscle or joint tenderness  Skin: No rashes or lesions  Psychiatric: No anxiety or depressed mood    Objective:     Vitals:    06/19/24 1600   BP: (!) 150/84   Pulse:    Resp:    Temp:    SpO2:      Body mass index is 40.24 kg/m².  Wt 305 lbs    General: No acute distress, conversant  Eyes: PERRLA, no scleral icterus  HENT: Normocephalic without oral lesions  Neck: Trachea midline without LAD  Cardiac: Normal pulse rate and rhythm  Pulmonary: Symmetric chest rise with normal effort  GI: Soft, morbidly obese, NT, ND, no hernia, no splenomegaly  Skin: Warm without rash  Extremities: No edema or joint stiffness  Psych: Appropriate mood and affect    Assessment:     48-year-old male preparing for surgery next week    Plan:     Once again discussed the risks and benefits of surgery including bleeding, infection, leak.  Plan for 2 days in the hospital.  Discussed the need for long-term vitamin compliance especially with the ELLIOTT.  Discussed the need for dietary and exercise compliance.  Patient expressed understanding of all the above.  I will see him next week.      Ravi Tobin MD, FACS, [facility]  Bariatric and General Surgeon  [facility]

## 2024-06-25 ENCOUNTER — CLINICAL DOCUMENTATION (OUTPATIENT)
Age: 49
End: 2024-06-25

## 2024-06-25 NOTE — PROGRESS NOTES
Approved  PA Detail   Prior authorization approved Case ID: [id number]      Payer: [employer] [place] - Managed Medicaid   PA Case: [id number], Status: Approved, Coverage Starts on: 6/25/2024 12:00:00 AM, Coverage Ends on: 6/25/2025 12:00:00 AM.[employer]  Mail order called approval to [employer] ENRIQUE CPT.She verified understanding.

## 2024-06-27 ENCOUNTER — ANESTHESIA EVENT (OUTPATIENT)
Facility: HOSPITAL | Age: 49
End: 2024-06-27
Payer: MEDICAID

## 2024-06-27 ENCOUNTER — ANESTHESIA (OUTPATIENT)
Facility: HOSPITAL | Age: 49
End: 2024-06-27
Payer: MEDICAID

## 2024-06-27 ENCOUNTER — HOSPITAL ENCOUNTER (INPATIENT)
Facility: HOSPITAL | Age: 49
LOS: 2 days | Discharge: HOME | DRG: 403 | End: 2024-06-29
Attending: SURGERY | Admitting: SURGERY
Payer: MEDICAID

## 2024-06-27 DIAGNOSIS — E66.01 MORBID OBESITY (HCC): Primary | ICD-10-CM

## 2024-06-27 LAB
EST. AVERAGE GLUCOSE BLD GHB EST-MCNC: 163 MG/DL
GLUCOSE BLD STRIP.AUTO-MCNC: 176 MG/DL (ref 65–117)
GLUCOSE BLD STRIP.AUTO-MCNC: 219 MG/DL (ref 65–117)
GLUCOSE BLD STRIP.AUTO-MCNC: 236 MG/DL (ref 65–117)
HBA1C MFR BLD: 7.3 % (ref 4–5.6)
SERVICE CMNT-IMP: ABNORMAL

## 2024-06-27 PROCEDURE — 6370000000 HC RX 637 (ALT 250 FOR IP): Performed by: SURGERY

## 2024-06-27 PROCEDURE — 2580000003 HC RX 258: Performed by: SURGERY

## 2024-06-27 PROCEDURE — 6360000002 HC RX W HCPCS: Performed by: SURGERY

## 2024-06-27 PROCEDURE — 43845 GASTROPLASTY DUODENAL SWITCH: CPT | Performed by: SURGERY

## 2024-06-27 PROCEDURE — 3600000009 HC SURGERY ROBOT BASE: Performed by: SURGERY

## 2024-06-27 PROCEDURE — 83036 HEMOGLOBIN GLYCOSYLATED A1C: CPT

## 2024-06-27 PROCEDURE — 1200000000 HC SEMI PRIVATE

## 2024-06-27 PROCEDURE — 7100000000 HC PACU RECOVERY - FIRST 15 MIN: Performed by: SURGERY

## 2024-06-27 PROCEDURE — 6360000002 HC RX W HCPCS: Performed by: ANESTHESIOLOGY

## 2024-06-27 PROCEDURE — 2580000003 HC RX 258: Performed by: NURSE ANESTHETIST, CERTIFIED REGISTERED

## 2024-06-27 PROCEDURE — 3700000001 HC ADD 15 MINUTES (ANESTHESIA): Performed by: SURGERY

## 2024-06-27 PROCEDURE — 43659 UNLISTED LAPS PX STOMACH: CPT | Performed by: PHYSICIAN ASSISTANT

## 2024-06-27 PROCEDURE — 2500000003 HC RX 250 WO HCPCS: Performed by: NURSE ANESTHETIST, CERTIFIED REGISTERED

## 2024-06-27 PROCEDURE — P9045 ALBUMIN (HUMAN), 5%, 250 ML: HCPCS | Performed by: NURSE ANESTHETIST, CERTIFIED REGISTERED

## 2024-06-27 PROCEDURE — 3600000019 HC SURGERY ROBOT ADDTL 15MIN: Performed by: SURGERY

## 2024-06-27 PROCEDURE — 2709999900 HC NON-CHARGEABLE SUPPLY: Performed by: SURGERY

## 2024-06-27 PROCEDURE — 8E0W4CZ ROBOTIC ASSISTED PROCEDURE OF TRUNK REGION, PERCUTANEOUS ENDOSCOPIC APPROACH: ICD-10-PCS | Performed by: SURGERY

## 2024-06-27 PROCEDURE — 82962 GLUCOSE BLOOD TEST: CPT

## 2024-06-27 PROCEDURE — C1781 MESH (IMPLANTABLE): HCPCS | Performed by: SURGERY

## 2024-06-27 PROCEDURE — 2720000010 HC SURG SUPPLY STERILE: Performed by: SURGERY

## 2024-06-27 PROCEDURE — S2900 ROBOTIC SURGICAL SYSTEM: HCPCS | Performed by: SURGERY

## 2024-06-27 PROCEDURE — 0DB64Z3 EXCISION OF STOMACH, PERCUTANEOUS ENDOSCOPIC APPROACH, VERTICAL: ICD-10-PCS | Performed by: SURGERY

## 2024-06-27 PROCEDURE — 3700000000 HC ANESTHESIA ATTENDED CARE: Performed by: SURGERY

## 2024-06-27 PROCEDURE — 43845 GASTROPLASTY DUODENAL SWITCH: CPT | Performed by: PHYSICIAN ASSISTANT

## 2024-06-27 PROCEDURE — 88307 TISSUE EXAM BY PATHOLOGIST: CPT

## 2024-06-27 PROCEDURE — 7100000001 HC PACU RECOVERY - ADDTL 15 MIN: Performed by: SURGERY

## 2024-06-27 PROCEDURE — 36415 COLL VENOUS BLD VENIPUNCTURE: CPT

## 2024-06-27 PROCEDURE — 6360000002 HC RX W HCPCS: Performed by: NURSE ANESTHETIST, CERTIFIED REGISTERED

## 2024-06-27 PROCEDURE — 0D194ZB BYPASS DUODENUM TO ILEUM, PERCUTANEOUS ENDOSCOPIC APPROACH: ICD-10-PCS | Performed by: SURGERY

## 2024-06-27 PROCEDURE — 43659 UNLISTED LAPS PX STOMACH: CPT | Performed by: SURGERY

## 2024-06-27 RX ORDER — HYDRALAZINE HYDROCHLORIDE 20 MG/ML
10 INJECTION INTRAMUSCULAR; INTRAVENOUS ONCE
Status: COMPLETED | OUTPATIENT
Start: 2024-06-27 | End: 2024-06-27

## 2024-06-27 RX ORDER — METRONIDAZOLE 500 MG/100ML
500 INJECTION, SOLUTION INTRAVENOUS
Status: COMPLETED | OUTPATIENT
Start: 2024-06-27 | End: 2024-06-27

## 2024-06-27 RX ORDER — INSULIN LISPRO 100 [IU]/ML
0-4 INJECTION, SOLUTION INTRAVENOUS; SUBCUTANEOUS NIGHTLY
Status: DISCONTINUED | OUTPATIENT
Start: 2024-06-27 | End: 2024-06-29 | Stop reason: HOSPADM

## 2024-06-27 RX ORDER — ONDANSETRON 2 MG/ML
4 INJECTION INTRAMUSCULAR; INTRAVENOUS EVERY 6 HOURS PRN
Status: DISCONTINUED | OUTPATIENT
Start: 2024-06-27 | End: 2024-06-29 | Stop reason: HOSPADM

## 2024-06-27 RX ORDER — SODIUM CHLORIDE 9 MG/ML
INJECTION, SOLUTION INTRAVENOUS PRN
Status: DISCONTINUED | OUTPATIENT
Start: 2024-06-27 | End: 2024-06-27 | Stop reason: HOSPADM

## 2024-06-27 RX ORDER — SODIUM CHLORIDE 0.9 % (FLUSH) 0.9 %
5-40 SYRINGE (ML) INJECTION EVERY 12 HOURS SCHEDULED
Status: DISCONTINUED | OUTPATIENT
Start: 2024-06-27 | End: 2024-06-29 | Stop reason: HOSPADM

## 2024-06-27 RX ORDER — ONDANSETRON 2 MG/ML
INJECTION INTRAMUSCULAR; INTRAVENOUS PRN
Status: DISCONTINUED | OUTPATIENT
Start: 2024-06-27 | End: 2024-06-27 | Stop reason: SDUPTHER

## 2024-06-27 RX ORDER — SODIUM CHLORIDE 0.9 % (FLUSH) 0.9 %
5-40 SYRINGE (ML) INJECTION PRN
Status: DISCONTINUED | OUTPATIENT
Start: 2024-06-27 | End: 2024-06-27 | Stop reason: HOSPADM

## 2024-06-27 RX ORDER — MIDAZOLAM HYDROCHLORIDE 1 MG/ML
INJECTION INTRAMUSCULAR; INTRAVENOUS PRN
Status: DISCONTINUED | OUTPATIENT
Start: 2024-06-27 | End: 2024-06-27 | Stop reason: SDUPTHER

## 2024-06-27 RX ORDER — DULOXETIN HYDROCHLORIDE 30 MG/1
60 CAPSULE, DELAYED RELEASE ORAL DAILY
Status: DISCONTINUED | OUTPATIENT
Start: 2024-06-28 | End: 2024-06-29 | Stop reason: HOSPADM

## 2024-06-27 RX ORDER — ROCURONIUM BROMIDE 10 MG/ML
INJECTION, SOLUTION INTRAVENOUS PRN
Status: DISCONTINUED | OUTPATIENT
Start: 2024-06-27 | End: 2024-06-27 | Stop reason: SDUPTHER

## 2024-06-27 RX ORDER — HYDROMORPHONE HYDROCHLORIDE 1 MG/ML
0.5 INJECTION, SOLUTION INTRAMUSCULAR; INTRAVENOUS; SUBCUTANEOUS EVERY 5 MIN PRN
Status: DISCONTINUED | OUTPATIENT
Start: 2024-06-27 | End: 2024-06-27 | Stop reason: HOSPADM

## 2024-06-27 RX ORDER — CYANOCOBALAMIN 1000 UG/ML
1000 INJECTION, SOLUTION INTRAMUSCULAR; SUBCUTANEOUS ONCE
Status: COMPLETED | OUTPATIENT
Start: 2024-06-28 | End: 2024-06-28

## 2024-06-27 RX ORDER — ACETAMINOPHEN 160 MG/5ML
1000 LIQUID ORAL EVERY 6 HOURS PRN
Status: DISCONTINUED | OUTPATIENT
Start: 2024-06-27 | End: 2024-06-29 | Stop reason: HOSPADM

## 2024-06-27 RX ORDER — ENOXAPARIN SODIUM 100 MG/ML
40 INJECTION SUBCUTANEOUS EVERY 12 HOURS SCHEDULED
Status: DISCONTINUED | OUTPATIENT
Start: 2024-06-28 | End: 2024-06-29 | Stop reason: HOSPADM

## 2024-06-27 RX ORDER — LOSARTAN POTASSIUM 50 MG/1
100 TABLET ORAL DAILY
Status: DISCONTINUED | OUTPATIENT
Start: 2024-06-28 | End: 2024-06-29 | Stop reason: HOSPADM

## 2024-06-27 RX ORDER — SODIUM CHLORIDE 0.9 % (FLUSH) 0.9 %
5-40 SYRINGE (ML) INJECTION EVERY 12 HOURS SCHEDULED
Status: DISCONTINUED | OUTPATIENT
Start: 2024-06-27 | End: 2024-06-27 | Stop reason: HOSPADM

## 2024-06-27 RX ORDER — MIDAZOLAM HYDROCHLORIDE 2 MG/2ML
2 INJECTION, SOLUTION INTRAMUSCULAR; INTRAVENOUS
Status: DISCONTINUED | OUTPATIENT
Start: 2024-06-27 | End: 2024-06-27 | Stop reason: HOSPADM

## 2024-06-27 RX ORDER — SODIUM CHLORIDE, SODIUM LACTATE, POTASSIUM CHLORIDE, CALCIUM CHLORIDE 600; 310; 30; 20 MG/100ML; MG/100ML; MG/100ML; MG/100ML
INJECTION, SOLUTION INTRAVENOUS CONTINUOUS
Status: DISCONTINUED | OUTPATIENT
Start: 2024-06-27 | End: 2024-06-27 | Stop reason: HOSPADM

## 2024-06-27 RX ORDER — DEXAMETHASONE SODIUM PHOSPHATE 4 MG/ML
INJECTION, SOLUTION INTRA-ARTICULAR; INTRALESIONAL; INTRAMUSCULAR; INTRAVENOUS; SOFT TISSUE PRN
Status: DISCONTINUED | OUTPATIENT
Start: 2024-06-27 | End: 2024-06-27 | Stop reason: SDUPTHER

## 2024-06-27 RX ORDER — PROPOFOL 10 MG/ML
INJECTION, EMULSION INTRAVENOUS PRN
Status: DISCONTINUED | OUTPATIENT
Start: 2024-06-27 | End: 2024-06-27 | Stop reason: SDUPTHER

## 2024-06-27 RX ORDER — SODIUM CHLORIDE AND POTASSIUM CHLORIDE 150; 450 MG/100ML; MG/100ML
100 INJECTION, SOLUTION INTRAVENOUS CONTINUOUS
Status: DISCONTINUED | OUTPATIENT
Start: 2024-06-27 | End: 2024-06-28

## 2024-06-27 RX ORDER — SCOLOPAMINE TRANSDERMAL SYSTEM 1 MG/1
1 PATCH, EXTENDED RELEASE TRANSDERMAL
Status: DISCONTINUED | OUTPATIENT
Start: 2024-06-27 | End: 2024-06-28

## 2024-06-27 RX ORDER — FENTANYL CITRATE 50 UG/ML
INJECTION, SOLUTION INTRAMUSCULAR; INTRAVENOUS PRN
Status: DISCONTINUED | OUTPATIENT
Start: 2024-06-27 | End: 2024-06-27 | Stop reason: SDUPTHER

## 2024-06-27 RX ORDER — HYDROMORPHONE HYDROCHLORIDE 2 MG/1
2 TABLET ORAL EVERY 4 HOURS PRN
Status: DISCONTINUED | OUTPATIENT
Start: 2024-06-27 | End: 2024-06-29 | Stop reason: HOSPADM

## 2024-06-27 RX ORDER — DALFAMPRIDINE 10 MG/1
10 TABLET, FILM COATED, EXTENDED RELEASE ORAL EVERY 12 HOURS
Status: DISCONTINUED | OUTPATIENT
Start: 2024-06-27 | End: 2024-06-29 | Stop reason: HOSPADM

## 2024-06-27 RX ORDER — DEXTROSE MONOHYDRATE 100 MG/ML
INJECTION, SOLUTION INTRAVENOUS CONTINUOUS PRN
Status: DISCONTINUED | OUTPATIENT
Start: 2024-06-27 | End: 2024-06-29 | Stop reason: HOSPADM

## 2024-06-27 RX ORDER — ONDANSETRON 4 MG/1
4 TABLET, ORALLY DISINTEGRATING ORAL EVERY 8 HOURS PRN
Status: DISCONTINUED | OUTPATIENT
Start: 2024-06-27 | End: 2024-06-29 | Stop reason: HOSPADM

## 2024-06-27 RX ORDER — OXYCODONE HYDROCHLORIDE 5 MG/1
5 TABLET ORAL
Status: DISCONTINUED | OUTPATIENT
Start: 2024-06-27 | End: 2024-06-27 | Stop reason: HOSPADM

## 2024-06-27 RX ORDER — THIAMINE HYDROCHLORIDE 100 MG/ML
100 INJECTION, SOLUTION INTRAMUSCULAR; INTRAVENOUS ONCE
Status: COMPLETED | OUTPATIENT
Start: 2024-06-28 | End: 2024-06-28

## 2024-06-27 RX ORDER — ONDANSETRON 2 MG/ML
4 INJECTION INTRAMUSCULAR; INTRAVENOUS
Status: COMPLETED | OUTPATIENT
Start: 2024-06-27 | End: 2024-06-27

## 2024-06-27 RX ORDER — SODIUM CHLORIDE 9 MG/ML
INJECTION, SOLUTION INTRAVENOUS PRN
Status: DISCONTINUED | OUTPATIENT
Start: 2024-06-27 | End: 2024-06-29 | Stop reason: HOSPADM

## 2024-06-27 RX ORDER — SODIUM CHLORIDE 9 MG/ML
INJECTION, SOLUTION INTRAVENOUS CONTINUOUS
Status: DISCONTINUED | OUTPATIENT
Start: 2024-06-27 | End: 2024-06-27 | Stop reason: HOSPADM

## 2024-06-27 RX ORDER — SODIUM CHLORIDE 0.9 % (FLUSH) 0.9 %
5-40 SYRINGE (ML) INJECTION PRN
Status: DISCONTINUED | OUTPATIENT
Start: 2024-06-27 | End: 2024-06-29 | Stop reason: HOSPADM

## 2024-06-27 RX ORDER — PROCHLORPERAZINE EDISYLATE 5 MG/ML
5 INJECTION INTRAMUSCULAR; INTRAVENOUS
Status: COMPLETED | OUTPATIENT
Start: 2024-06-27 | End: 2024-06-27

## 2024-06-27 RX ORDER — HYDROMORPHONE HYDROCHLORIDE 1 MG/ML
1 INJECTION, SOLUTION INTRAMUSCULAR; INTRAVENOUS; SUBCUTANEOUS
Status: DISCONTINUED | OUTPATIENT
Start: 2024-06-27 | End: 2024-06-29 | Stop reason: HOSPADM

## 2024-06-27 RX ORDER — BUPIVACAINE HYDROCHLORIDE 5 MG/ML
INJECTION, SOLUTION EPIDURAL; INTRACAUDAL PRN
Status: DISCONTINUED | OUTPATIENT
Start: 2024-06-27 | End: 2024-06-27 | Stop reason: HOSPADM

## 2024-06-27 RX ORDER — DIPHENHYDRAMINE HYDROCHLORIDE 50 MG/ML
12.5 INJECTION INTRAMUSCULAR; INTRAVENOUS EVERY 6 HOURS PRN
Status: DISCONTINUED | OUTPATIENT
Start: 2024-06-27 | End: 2024-06-29 | Stop reason: HOSPADM

## 2024-06-27 RX ORDER — GABAPENTIN 250 MG/5ML
500 SOLUTION ORAL
Status: DISCONTINUED | OUTPATIENT
Start: 2024-06-27 | End: 2024-06-27 | Stop reason: HOSPADM

## 2024-06-27 RX ORDER — METOCLOPRAMIDE HYDROCHLORIDE 5 MG/ML
10 INJECTION INTRAMUSCULAR; INTRAVENOUS EVERY 6 HOURS PRN
Status: DISCONTINUED | OUTPATIENT
Start: 2024-06-27 | End: 2024-06-28

## 2024-06-27 RX ORDER — LIDOCAINE HYDROCHLORIDE 20 MG/ML
INJECTION, SOLUTION EPIDURAL; INFILTRATION; INTRACAUDAL; PERINEURAL PRN
Status: DISCONTINUED | OUTPATIENT
Start: 2024-06-27 | End: 2024-06-27 | Stop reason: SDUPTHER

## 2024-06-27 RX ORDER — BUPIVACAINE HYDROCHLORIDE AND EPINEPHRINE 5; 5 MG/ML; UG/ML
INJECTION, SOLUTION PERINEURAL PRN
Status: DISCONTINUED | OUTPATIENT
Start: 2024-06-27 | End: 2024-06-27 | Stop reason: HOSPADM

## 2024-06-27 RX ORDER — ALBUMIN, HUMAN INJ 5% 5 %
SOLUTION INTRAVENOUS PRN
Status: DISCONTINUED | OUTPATIENT
Start: 2024-06-27 | End: 2024-06-27 | Stop reason: SDUPTHER

## 2024-06-27 RX ORDER — AMLODIPINE BESYLATE 5 MG/1
10 TABLET ORAL DAILY
Status: DISCONTINUED | OUTPATIENT
Start: 2024-06-28 | End: 2024-06-29 | Stop reason: HOSPADM

## 2024-06-27 RX ORDER — KETAMINE HCL IN NACL, ISO-OSM 100MG/10ML
SYRINGE (ML) INJECTION PRN
Status: DISCONTINUED | OUTPATIENT
Start: 2024-06-27 | End: 2024-06-27 | Stop reason: SDUPTHER

## 2024-06-27 RX ORDER — HYDROXYZINE HYDROCHLORIDE 10 MG/1
10 TABLET, FILM COATED ORAL EVERY 8 HOURS PRN
Status: DISCONTINUED | OUTPATIENT
Start: 2024-06-27 | End: 2024-06-29 | Stop reason: HOSPADM

## 2024-06-27 RX ORDER — PHENYLEPHRINE HCL IN 0.9% NACL 0.4MG/10ML
SYRINGE (ML) INTRAVENOUS PRN
Status: DISCONTINUED | OUTPATIENT
Start: 2024-06-27 | End: 2024-06-27 | Stop reason: SDUPTHER

## 2024-06-27 RX ORDER — MEPERIDINE HYDROCHLORIDE 25 MG/ML
12.5 INJECTION INTRAMUSCULAR; INTRAVENOUS; SUBCUTANEOUS EVERY 5 MIN PRN
Status: DISCONTINUED | OUTPATIENT
Start: 2024-06-27 | End: 2024-06-27 | Stop reason: HOSPADM

## 2024-06-27 RX ORDER — NALOXONE HYDROCHLORIDE 0.4 MG/ML
INJECTION, SOLUTION INTRAMUSCULAR; INTRAVENOUS; SUBCUTANEOUS PRN
Status: DISCONTINUED | OUTPATIENT
Start: 2024-06-27 | End: 2024-06-27 | Stop reason: HOSPADM

## 2024-06-27 RX ORDER — ACETAMINOPHEN 160 MG/5ML
1000 LIQUID ORAL
Status: DISCONTINUED | OUTPATIENT
Start: 2024-06-27 | End: 2024-06-27 | Stop reason: HOSPADM

## 2024-06-27 RX ORDER — HYDRALAZINE HYDROCHLORIDE 20 MG/ML
20 INJECTION INTRAMUSCULAR; INTRAVENOUS EVERY 4 HOURS PRN
Status: DISCONTINUED | OUTPATIENT
Start: 2024-06-27 | End: 2024-06-29 | Stop reason: HOSPADM

## 2024-06-27 RX ORDER — LIDOCAINE HYDROCHLORIDE 10 MG/ML
1 INJECTION, SOLUTION EPIDURAL; INFILTRATION; INTRACAUDAL; PERINEURAL
Status: DISCONTINUED | OUTPATIENT
Start: 2024-06-27 | End: 2024-06-27 | Stop reason: HOSPADM

## 2024-06-27 RX ORDER — DIPHENHYDRAMINE HYDROCHLORIDE 50 MG/ML
12.5 INJECTION INTRAMUSCULAR; INTRAVENOUS
Status: DISCONTINUED | OUTPATIENT
Start: 2024-06-27 | End: 2024-06-27 | Stop reason: HOSPADM

## 2024-06-27 RX ORDER — INSULIN LISPRO 100 [IU]/ML
0-16 INJECTION, SOLUTION INTRAVENOUS; SUBCUTANEOUS
Status: DISCONTINUED | OUTPATIENT
Start: 2024-06-27 | End: 2024-06-29 | Stop reason: HOSPADM

## 2024-06-27 RX ORDER — FENTANYL CITRATE 50 UG/ML
25 INJECTION, SOLUTION INTRAMUSCULAR; INTRAVENOUS
Status: DISCONTINUED | OUTPATIENT
Start: 2024-06-27 | End: 2024-06-27 | Stop reason: HOSPADM

## 2024-06-27 RX ORDER — GABAPENTIN 600 MG/1
300 TABLET ORAL 2 TIMES DAILY
Status: DISCONTINUED | OUTPATIENT
Start: 2024-06-27 | End: 2024-06-28

## 2024-06-27 RX ORDER — ACETAMINOPHEN 325 MG/1
650 TABLET ORAL ONCE
Status: DISCONTINUED | OUTPATIENT
Start: 2024-06-27 | End: 2024-06-27 | Stop reason: HOSPADM

## 2024-06-27 RX ORDER — HYDROMORPHONE HYDROCHLORIDE 2 MG/1
4 TABLET ORAL EVERY 4 HOURS PRN
Status: DISCONTINUED | OUTPATIENT
Start: 2024-06-27 | End: 2024-06-29 | Stop reason: HOSPADM

## 2024-06-27 RX ORDER — SODIUM CHLORIDE, SODIUM LACTATE, POTASSIUM CHLORIDE, CALCIUM CHLORIDE 600; 310; 30; 20 MG/100ML; MG/100ML; MG/100ML; MG/100ML
INJECTION, SOLUTION INTRAVENOUS CONTINUOUS PRN
Status: DISCONTINUED | OUTPATIENT
Start: 2024-06-27 | End: 2024-06-27 | Stop reason: SDUPTHER

## 2024-06-27 RX ORDER — FENTANYL CITRATE 50 UG/ML
50 INJECTION, SOLUTION INTRAMUSCULAR; INTRAVENOUS
Status: DISCONTINUED | OUTPATIENT
Start: 2024-06-27 | End: 2024-06-27 | Stop reason: HOSPADM

## 2024-06-27 RX ORDER — CYCLOBENZAPRINE HCL 10 MG
10 TABLET ORAL 3 TIMES DAILY PRN
Status: DISCONTINUED | OUTPATIENT
Start: 2024-06-27 | End: 2024-06-29 | Stop reason: HOSPADM

## 2024-06-27 RX ORDER — FENTANYL CITRATE 50 UG/ML
25 INJECTION, SOLUTION INTRAMUSCULAR; INTRAVENOUS EVERY 5 MIN PRN
Status: DISCONTINUED | OUTPATIENT
Start: 2024-06-27 | End: 2024-06-27 | Stop reason: HOSPADM

## 2024-06-27 RX ORDER — SCOLOPAMINE TRANSDERMAL SYSTEM 1 MG/1
1 PATCH, EXTENDED RELEASE TRANSDERMAL
Status: DISCONTINUED | OUTPATIENT
Start: 2024-06-27 | End: 2024-06-27

## 2024-06-27 RX ORDER — PROCHLORPERAZINE EDISYLATE 5 MG/ML
10 INJECTION INTRAMUSCULAR; INTRAVENOUS EVERY 6 HOURS PRN
Status: DISCONTINUED | OUTPATIENT
Start: 2024-06-27 | End: 2024-06-28

## 2024-06-27 RX ORDER — LABETALOL HYDROCHLORIDE 5 MG/ML
10 INJECTION, SOLUTION INTRAVENOUS
Status: COMPLETED | OUTPATIENT
Start: 2024-06-27 | End: 2024-06-27

## 2024-06-27 RX ORDER — MAGNESIUM SULFATE HEPTAHYDRATE 40 MG/ML
INJECTION, SOLUTION INTRAVENOUS PRN
Status: DISCONTINUED | OUTPATIENT
Start: 2024-06-27 | End: 2024-06-27 | Stop reason: SDUPTHER

## 2024-06-27 RX ORDER — LIDOCAINE HYDROCHLORIDE ANHYDROUS AND DEXTROSE MONOHYDRATE 5; 400 G/100ML; MG/100ML
INJECTION, SOLUTION INTRAVENOUS CONTINUOUS PRN
Status: DISCONTINUED | OUTPATIENT
Start: 2024-06-27 | End: 2024-06-27 | Stop reason: SDUPTHER

## 2024-06-27 RX ORDER — SIMETHICONE 80 MG
125 TABLET,CHEWABLE ORAL EVERY 6 HOURS PRN
Status: DISCONTINUED | OUTPATIENT
Start: 2024-06-27 | End: 2024-06-29 | Stop reason: HOSPADM

## 2024-06-27 RX ORDER — LORAZEPAM 2 MG/ML
1 INJECTION INTRAMUSCULAR EVERY 6 HOURS PRN
Status: DISCONTINUED | OUTPATIENT
Start: 2024-06-27 | End: 2024-06-29 | Stop reason: HOSPADM

## 2024-06-27 RX ADMIN — DEXAMETHASONE SODIUM PHOSPHATE 8 MG: 4 INJECTION, SOLUTION INTRAMUSCULAR; INTRAVENOUS at 11:10

## 2024-06-27 RX ADMIN — PROPOFOL 250 MG: 10 INJECTION, EMULSION INTRAVENOUS at 10:41

## 2024-06-27 RX ADMIN — FENTANYL CITRATE 25 MCG: 50 INJECTION INTRAMUSCULAR; INTRAVENOUS at 13:07

## 2024-06-27 RX ADMIN — HYDRALAZINE HYDROCHLORIDE 10 MG: 20 INJECTION INTRAMUSCULAR; INTRAVENOUS at 13:29

## 2024-06-27 RX ADMIN — LIDOCAINE HYDROCHLORIDE 1 MG/KG/HR: 4 INJECTION, SOLUTION INTRAVENOUS at 10:51

## 2024-06-27 RX ADMIN — ONDANSETRON 4 MG: 2 INJECTION INTRAMUSCULAR; INTRAVENOUS at 12:07

## 2024-06-27 RX ADMIN — LABETALOL HYDROCHLORIDE 10 MG: 5 INJECTION, SOLUTION INTRAVENOUS at 12:52

## 2024-06-27 RX ADMIN — SODIUM CHLORIDE, POTASSIUM CHLORIDE, SODIUM LACTATE AND CALCIUM CHLORIDE: 600; 310; 30; 20 INJECTION, SOLUTION INTRAVENOUS at 10:26

## 2024-06-27 RX ADMIN — SODIUM CHLORIDE 3000 MG: 900 INJECTION INTRAVENOUS at 10:50

## 2024-06-27 RX ADMIN — LIDOCAINE HYDROCHLORIDE 100 MG: 20 INJECTION, SOLUTION EPIDURAL; INFILTRATION; INTRACAUDAL; PERINEURAL at 10:41

## 2024-06-27 RX ADMIN — MAGNESIUM SULFATE HEPTAHYDRATE 2000 MG: 40 INJECTION, SOLUTION INTRAVENOUS at 12:10

## 2024-06-27 RX ADMIN — HYDROMORPHONE HYDROCHLORIDE 0.5 MG: 1 INJECTION, SOLUTION INTRAMUSCULAR; INTRAVENOUS; SUBCUTANEOUS at 13:03

## 2024-06-27 RX ADMIN — ROCURONIUM BROMIDE 50 MG: 10 INJECTION, SOLUTION INTRAVENOUS at 10:42

## 2024-06-27 RX ADMIN — PROCHLORPERAZINE EDISYLATE 5 MG: 5 INJECTION INTRAMUSCULAR; INTRAVENOUS at 12:50

## 2024-06-27 RX ADMIN — ONDANSETRON 4 MG: 2 INJECTION INTRAMUSCULAR; INTRAVENOUS at 13:52

## 2024-06-27 RX ADMIN — HYDRALAZINE HYDROCHLORIDE 10 MG: 20 INJECTION INTRAMUSCULAR; INTRAVENOUS at 14:26

## 2024-06-27 RX ADMIN — LABETALOL HYDROCHLORIDE 10 MG: 5 INJECTION, SOLUTION INTRAVENOUS at 13:10

## 2024-06-27 RX ADMIN — ROCURONIUM BROMIDE 10 MG: 10 INJECTION, SOLUTION INTRAVENOUS at 11:50

## 2024-06-27 RX ADMIN — POTASSIUM CHLORIDE AND SODIUM CHLORIDE 100 ML/HR: 450; 150 INJECTION, SOLUTION INTRAVENOUS at 13:35

## 2024-06-27 RX ADMIN — Medication 80 MCG: at 11:55

## 2024-06-27 RX ADMIN — Medication 120 MCG: at 10:58

## 2024-06-27 RX ADMIN — MEPERIDINE HYDROCHLORIDE 12.5 MG: 25 INJECTION INTRAMUSCULAR; INTRAVENOUS; SUBCUTANEOUS at 14:14

## 2024-06-27 RX ADMIN — GABAPENTIN 500 MG: 250 SOLUTION ORAL at 08:26

## 2024-06-27 RX ADMIN — ACETAMINOPHEN 1000 MG: 650 SOLUTION ORAL at 08:27

## 2024-06-27 RX ADMIN — HYDROMORPHONE HYDROCHLORIDE 1 MG: 1 INJECTION, SOLUTION INTRAMUSCULAR; INTRAVENOUS; SUBCUTANEOUS at 16:50

## 2024-06-27 RX ADMIN — Medication 20 MG: at 11:50

## 2024-06-27 RX ADMIN — ROCURONIUM BROMIDE 10 MG: 10 INJECTION, SOLUTION INTRAVENOUS at 11:08

## 2024-06-27 RX ADMIN — SODIUM CHLORIDE, PRESERVATIVE FREE 10 ML: 5 INJECTION INTRAVENOUS at 23:19

## 2024-06-27 RX ADMIN — MIDAZOLAM HYDROCHLORIDE 3 MG: 1 INJECTION, SOLUTION INTRAMUSCULAR; INTRAVENOUS at 10:38

## 2024-06-27 RX ADMIN — ALBUMIN (HUMAN) 12.5 G: 12.5 INJECTION, SOLUTION INTRAVENOUS at 10:57

## 2024-06-27 RX ADMIN — MIDAZOLAM HYDROCHLORIDE 2 MG: 1 INJECTION, SOLUTION INTRAMUSCULAR; INTRAVENOUS at 10:26

## 2024-06-27 RX ADMIN — Medication 30 MG: at 11:17

## 2024-06-27 RX ADMIN — FENTANYL CITRATE 100 MCG: 50 INJECTION, SOLUTION INTRAMUSCULAR; INTRAVENOUS at 10:41

## 2024-06-27 RX ADMIN — PROPOFOL 50 MG: 10 INJECTION, EMULSION INTRAVENOUS at 11:26

## 2024-06-27 RX ADMIN — FENTANYL CITRATE 25 MCG: 50 INJECTION INTRAMUSCULAR; INTRAVENOUS at 13:15

## 2024-06-27 RX ADMIN — Medication 80 MCG: at 10:51

## 2024-06-27 RX ADMIN — GABAPENTIN 300 MG: 600 TABLET, FILM COATED ORAL at 23:19

## 2024-06-27 RX ADMIN — POTASSIUM CHLORIDE AND SODIUM CHLORIDE 100 ML/HR: 450; 150 INJECTION, SOLUTION INTRAVENOUS at 23:31

## 2024-06-27 RX ADMIN — METRONIDAZOLE 500 MG: 5 INJECTION, SOLUTION INTRAVENOUS at 10:55

## 2024-06-27 ASSESSMENT — PAIN DESCRIPTION - LOCATION
LOCATION: ABDOMEN

## 2024-06-27 ASSESSMENT — PAIN DESCRIPTION - ORIENTATION
ORIENTATION: MID
ORIENTATION: MID

## 2024-06-27 ASSESSMENT — PAIN SCALES - GENERAL
PAINLEVEL_OUTOF10: 6
PAINLEVEL_OUTOF10: 8
PAINLEVEL_OUTOF10: 2
PAINLEVEL_OUTOF10: 9
PAINLEVEL_OUTOF10: 7

## 2024-06-27 ASSESSMENT — PAIN - FUNCTIONAL ASSESSMENT: PAIN_FUNCTIONAL_ASSESSMENT: NONE - DENIES PAIN

## 2024-06-27 ASSESSMENT — PAIN DESCRIPTION - DESCRIPTORS
DESCRIPTORS: ACHING
DESCRIPTORS: SHARP

## 2024-06-27 NOTE — ANESTHESIA POSTPROCEDURE EVALUATION
Department of Anesthesiology  Postprocedure Note    Patient: Miguel Carroll  MRN: [id number]  YOB: 1975  Date of evaluation: 6/27/2024    Procedure Summary       Date: 06/27/24 Room / Location: [facility] MAIN OR [address] [place] [address] MAIN OR    Anesthesia Start: 1026 Anesthesia Stop: 1251    Procedure: ROBOTIC SLEEVE GASTRECTOMY SINGLE ANASTOMOSIS DUODENAL ILEOSTOMY (E R A S) (Abdomen) Diagnosis:       Morbid obesity (HCC)      (Morbid obesity (HCC) [E66.01])    Providers: Ravi Tobin MD Responsible Provider: Tommy Pascual MD    Anesthesia Type: General ASA Status: 3            Anesthesia Type: General    Harpal Phase I: Harpal Score: 9    Harpal Phase II:      Anesthesia Post Evaluation  Post-Anesthesia Evaluation and Assessment    Patient: Miguel Carroll MRN: [id number]  SSN: [id number]    YOB: 1975  Age: 48 y.o.  Sex: male      I have evaluated the patient and they are stable and ready for discharge from the PACU.     Cardiovascular Function/Vital Signs  Visit Vitals  BP (!) 150/68   Pulse (!) 105   Temp 98.2 °F (36.8 °C) (Oral)   Resp 16   Ht 1.854 m (6' 1\")   Wt (!) 138.8 kg (305 lb 14.4 oz)   SpO2 95%   BMI 40.36 kg/m²       Patient is status post General anesthesia for Procedure(s):  ROBOTIC SLEEVE GASTRECTOMY SINGLE ANASTOMOSIS DUODENAL ILEOSTOMY (E R A S).    Nausea/Vomiting: None    Postoperative hydration reviewed and adequate.    Pain:      Managed    Neurological Status:       At baseline    Mental Status, Level of Consciousness: Alert and  oriented to person, place, and time    Pulmonary Status:       Adequate oxygenation and airway patent    Complications related to anesthesia: None    Post-anesthesia assessment completed. No concerns    Signed By: Tommy Pascual MD     June 27, 2024                No notable events documented.

## 2024-06-27 NOTE — PERIOP NOTE
TRANSFER - OUT REPORT:    Verbal report given to Areli RAMIREZ(name) on Miguel Carroll  being transferred to (unit) for routine post-op       Report consisted of patient’s Situation, Background, Assessment and   Recommendations(SBAR).     Time Pre op antibiotic given:1050  Anesthesia Stop time: 1251    Information from the following report(s) SBAR, Kardex, OR Summary, Procedure Summary, Intake/Output, and MAR was reviewed with the receiving nurse.    Opportunity for questions and clarification was provided.     Is the patient on 02? Yes       L/Min 2       Other na    Is the patient on a monitor? Yes    Is the nurse transporting with the patient? Yes    Surgical Waiting Area notified of patient's transfer from PACU? Yes

## 2024-06-27 NOTE — OP NOTE
OPERATIVE NOTE    Date of Procedure: 6/27/2024     Preoperative Diagnosis: Morbid obesity (HCC) [E66.01]  Postoperative Diagnosis: Post-Op Diagnosis Codes:     * Morbid obesity (HCC) [E66.01]      Procedure: Procedure(s):  ROBOTIC SLEEVE GASTRECTOMY SINGLE ANASTOMOSIS DUODENAL ILEOSTOMY (E R A S)    Surgeon: Ravi Tobin MD  Assistant(s): WEST Mustafa - They were critically important in the exposure and key portions of the case including entry, exposure, instrument exchange, and closure of the abdomen.  Surgical Staff: Circulator: Danita Garcia RN  Relief Circulator: Apple Martinez RN  Relief Scrub: Marcin Monaco RN  Scrub Person First: Garland Lorenz  Circulator Assist: Muriel Rehman RN    Anesthesia: General   Indications: 47 y/o M presents with morbid obesity here for ELLIOTT procedure.  Findings: Normal intra-abdominal anatomy    Description of Operation: Miguel Carroll was identified in the pre-operative holding area. Informed consent was obtained after a complete discussion of risks, benefits and alternatives to surgery were had with the patient.    The patient was brought back to the operating room and placed under general endotracheal anesthesia in the supine position on the operating room table with right arm tucked. The patient was then prepped and draped in the usual sterile fashion. A timeout was performed.      We then injected local anesthetic 15 cm below the xiphoid to the left of midline. A 5 mm incision was then made using an 11 blade. We entered the abdomen safely with a 5 mm optiview trocar and insufflated the abdomen. Next, we placed a 12 mm trocar in the right mid abdomen along the midclavicular line followed by two 8 mm trocars in the left mid abdomen. We then up sized the 5 mm trocar to an 8 mm trocar.  An additional 8 mm trocar was placed in the right lateral abdomen for an assistant trocar.    A Kirill retractor was then inserted in the epigastrium to retract the left  lobe of the liver.     The patient was then placed in steep reverse Trendelenburg. Hiatal inspected demonstrated no hiatal hernia.    The robot was docked.    I began by using the vessel sealer to divided the omentum at the site of the future DI anastomosis    We identified the terminal ileum.  We then counted back 300 cm and marked the proximal and distal aspects with a marking pen. We then pexied the bowel to the RUQ with 3-0 STRATAFIX suture to the omentum.    Next, using the vessel sealer I incised the gastrocolic ligament along the greater curvature. We took the vessel sealer up the greater curvature of the stomach all the way up to the short gastric vessels. These were taken down meticulously with the vessel sealer. Dissection was continued up to the left farzad and we released the Angle of His.    Next, we turned our attention to the gastric antrum. The remainder of the greater curvature the stomach was taken down with the vessel sealer toward the antum. We continued our dissection towards the antrum and onto the first portion of the duodenum. We carefully dissected D1 off of the pancreatic head.  We created a window for future D1 transection.  We ensured all posterior attachments were freed.    Next, we passed the 40 Russian ViSiGi 3D down the esophagus safely into the stomach. It was positioned appropriately into the antrum and placed on suction.    We then used a 60 mm Sure-form green robotic stapler load with reinforcement for our first staple load followed by another reinforced green load and then multiple blue loads with reinforcement to finish pouch careful to not narrow the pouch. We were at least 1 cm off of the GE junction on the last fire.      The specimen was left in the left upper quadrant.    Next, we repositioned the Kirill retractor to retract more of the right lobe of the liver.  We clearly identified the duodenal bulb.  We also identified the naeem hepatis.  Ensured an adequate posterior

## 2024-06-27 NOTE — ANESTHESIA PRE PROCEDURE
Department of Anesthesiology  Preprocedure Note       Name:  Miguel Carroll   Age:  48 y.o.  :  1975                                          MRN:  [id number]         Date:  2024      Surgeon: Surgeon(s):  Ravi Tobin MD    Procedure: Procedure(s):  ROBOTIC SLEEVE GASTRECTOMY SINGLE ANASTOMOSIS DUODENAL ILEOSTOMY (E R A S)    Medications prior to admission:   Prior to Admission medications    Medication Sig Start Date End Date Taking? Authorizing Provider   polyethylene glycol (GLYCOLAX) 17 GM/SCOOP powder Take 17 g by mouth daily 6/12/24 9/10/24  Bernice Bhatti APRN - NP   ondansetron (ZOFRAN) 4 MG tablet Take 1 tablet by mouth every 8 hours as needed for Nausea or Vomiting 24   Bernice Bhatti APRN - NP   omeprazole (PRILOSEC) 40 MG delayed release capsule Take 1 capsule by mouth every morning (before breakfast) 24   Bernice Bhatti APRN - NP   hyoscyamine (LEVSIN/SL) 125 MCG sublingual tablet Place 1 tablet under the tongue every 6 hours as needed for Cramping 24   Bernice Bhatti APRN - NP   DULoxetine (CYMBALTA) 60 MG extended release capsule Take 1 capsule by mouth daily. 6/3/24   Rosa Flores PA-C   gabapentin (NEURONTIN) 600 MG tablet Take 2 tablets by mouth 3 times daily for 180 days. Max Daily Amount: 3,600 mg 5/1/24 10/28/24  Rosa Flores PA-C   Continuous Glucose Monitor Sup KIT Use daily. (E11.42) Type 2 diabetes mellitus with diabetic polyneuropathy, without long-term current use of insulin (HCC) 3/22/24   Rosa Flores PA-C   dalfampridine (AMPYRA) 10 MG extended release tablet Take 1 tablet by mouth in the morning and 1 tablet in the evening. 3/21/24   Barrie Mills MD   insulin glargine (BASAGLAR KWIKPEN) 100 UNIT/ML injection pen Inject 50 units under the skin every morning for type 2 diabetes mellitus. 23   Rosa Flores PA-C   olmesartan (BENICAR) 40 MG tablet Take 1 tablet by mouth daily 23   Rosa Flores PA-C   Cholecalciferol

## 2024-06-28 ENCOUNTER — APPOINTMENT (OUTPATIENT)
Facility: HOSPITAL | Age: 49
DRG: 403 | End: 2024-06-28
Attending: SURGERY
Payer: MEDICAID

## 2024-06-28 LAB
ANION GAP BLD CALC-SCNC: 11 (ref 10–20)
ANION GAP SERPL CALC-SCNC: 7 MMOL/L (ref 5–15)
BASOPHILS # BLD: 0 K/UL (ref 0–0.1)
BASOPHILS NFR BLD: 0 % (ref 0–1)
BUN SERPL-MCNC: 11 MG/DL (ref 6–20)
BUN/CREAT SERPL: 9 (ref 12–20)
CA-I BLD-MCNC: 1.15 MMOL/L (ref 1.12–1.32)
CALCIUM SERPL-MCNC: 9.4 MG/DL (ref 8.5–10.1)
CHLORIDE BLD-SCNC: 100 MMOL/L (ref 100–108)
CHLORIDE SERPL-SCNC: 101 MMOL/L (ref 97–108)
CO2 BLD-SCNC: 26 MMOL/L (ref 19–24)
CO2 SERPL-SCNC: 24 MMOL/L (ref 21–32)
COMMENT:: NORMAL
CREAT SERPL-MCNC: 1.28 MG/DL (ref 0.7–1.3)
CREAT UR-MCNC: 0.9 MG/DL (ref 0.6–1.3)
DIFFERENTIAL METHOD BLD: ABNORMAL
EOSINOPHIL # BLD: 0 K/UL (ref 0–0.4)
EOSINOPHIL NFR BLD: 0 % (ref 0–7)
ERYTHROCYTE [DISTWIDTH] IN BLOOD BY AUTOMATED COUNT: 13.4 % (ref 11.5–14.5)
GLUCOSE BLD STRIP.AUTO-MCNC: 125 MG/DL (ref 65–117)
GLUCOSE BLD STRIP.AUTO-MCNC: 174 MG/DL (ref 65–117)
GLUCOSE BLD STRIP.AUTO-MCNC: 179 MG/DL (ref 65–117)
GLUCOSE BLD STRIP.AUTO-MCNC: 200 MG/DL (ref 65–117)
GLUCOSE BLD STRIP.AUTO-MCNC: 213 MG/DL (ref 65–117)
GLUCOSE BLD STRIP.AUTO-MCNC: 246 MG/DL (ref 74–99)
GLUCOSE SERPL-MCNC: 223 MG/DL (ref 65–100)
HCT VFR BLD AUTO: 41.4 % (ref 36.6–50.3)
HGB BLD-MCNC: 14.2 G/DL (ref 12.1–17)
IMM GRANULOCYTES # BLD AUTO: 0 K/UL (ref 0–0.04)
IMM GRANULOCYTES NFR BLD AUTO: 0 % (ref 0–0.5)
LACTATE BLD-SCNC: 3.9 MMOL/L (ref 0.4–2)
LACTATE SERPL-SCNC: 0.8 MMOL/L (ref 0.4–2)
LACTATE SERPL-SCNC: 3.3 MMOL/L (ref 0.4–2)
LYMPHOCYTES # BLD: 1.5 K/UL (ref 0.8–3.5)
LYMPHOCYTES NFR BLD: 13 % (ref 12–49)
MCH RBC QN AUTO: 26.4 PG (ref 26–34)
MCHC RBC AUTO-ENTMCNC: 34.3 G/DL (ref 30–36.5)
MCV RBC AUTO: 77.1 FL (ref 80–99)
MONOCYTES # BLD: 0.7 K/UL (ref 0–1)
MONOCYTES NFR BLD: 6 % (ref 5–13)
NEUTS SEG # BLD: 9.2 K/UL (ref 1.8–8)
NEUTS SEG NFR BLD: 81 % (ref 32–75)
NRBC # BLD: 0 K/UL (ref 0–0.01)
NRBC BLD-RTO: 0 PER 100 WBC
PLATELET # BLD AUTO: 279 K/UL (ref 150–400)
PMV BLD AUTO: 10.1 FL (ref 8.9–12.9)
POTASSIUM BLD-SCNC: 3.9 MMOL/L (ref 3.5–5.5)
POTASSIUM SERPL-SCNC: 3.9 MMOL/L (ref 3.5–5.1)
RBC # BLD AUTO: 5.37 M/UL (ref 4.1–5.7)
SERVICE CMNT-IMP: ABNORMAL
SODIUM BLD-SCNC: 137 MMOL/L (ref 136–145)
SODIUM SERPL-SCNC: 132 MMOL/L (ref 136–145)
SPECIMEN HOLD: NORMAL
SPECIMEN SITE: ABNORMAL
WBC # BLD AUTO: 11.5 K/UL (ref 4.1–11.1)

## 2024-06-28 PROCEDURE — 6360000002 HC RX W HCPCS: Performed by: SURGERY

## 2024-06-28 PROCEDURE — 6370000000 HC RX 637 (ALT 250 FOR IP): Performed by: SURGERY

## 2024-06-28 PROCEDURE — 87040 BLOOD CULTURE FOR BACTERIA: CPT

## 2024-06-28 PROCEDURE — 80047 BASIC METABLC PNL IONIZED CA: CPT

## 2024-06-28 PROCEDURE — 83605 ASSAY OF LACTIC ACID: CPT

## 2024-06-28 PROCEDURE — 36415 COLL VENOUS BLD VENIPUNCTURE: CPT

## 2024-06-28 PROCEDURE — 70498 CT ANGIOGRAPHY NECK: CPT

## 2024-06-28 PROCEDURE — 2580000003 HC RX 258: Performed by: SURGERY

## 2024-06-28 PROCEDURE — C9113 INJ PANTOPRAZOLE SODIUM, VIA: HCPCS | Performed by: SURGERY

## 2024-06-28 PROCEDURE — 80048 BASIC METABOLIC PNL TOTAL CA: CPT

## 2024-06-28 PROCEDURE — 99024 POSTOP FOLLOW-UP VISIT: CPT | Performed by: SURGERY

## 2024-06-28 PROCEDURE — 82962 GLUCOSE BLOOD TEST: CPT

## 2024-06-28 PROCEDURE — 85025 COMPLETE CBC W/AUTO DIFF WBC: CPT

## 2024-06-28 PROCEDURE — 1200000000 HC SEMI PRIVATE

## 2024-06-28 PROCEDURE — 6360000004 HC RX CONTRAST MEDICATION: Performed by: RADIOLOGY

## 2024-06-28 PROCEDURE — 70450 CT HEAD/BRAIN W/O DYE: CPT

## 2024-06-28 RX ORDER — SODIUM CHLORIDE 9 MG/ML
INJECTION, SOLUTION INTRAVENOUS CONTINUOUS
Status: DISCONTINUED | OUTPATIENT
Start: 2024-06-28 | End: 2024-06-29 | Stop reason: HOSPADM

## 2024-06-28 RX ORDER — FLUCONAZOLE 100 MG/1
150 TABLET ORAL ONCE
Status: DISCONTINUED | OUTPATIENT
Start: 2024-06-28 | End: 2024-06-28

## 2024-06-28 RX ORDER — METOCLOPRAMIDE HYDROCHLORIDE 5 MG/ML
10 INJECTION INTRAMUSCULAR; INTRAVENOUS EVERY 6 HOURS
Status: DISCONTINUED | OUTPATIENT
Start: 2024-06-28 | End: 2024-06-28

## 2024-06-28 RX ORDER — OXYCODONE HYDROCHLORIDE 5 MG/1
5 TABLET ORAL EVERY 6 HOURS PRN
Qty: 10 TABLET | Refills: 0 | Status: SHIPPED | OUTPATIENT
Start: 2024-06-28 | End: 2024-07-01

## 2024-06-28 RX ORDER — 0.9 % SODIUM CHLORIDE 0.9 %
1000 INTRAVENOUS SOLUTION INTRAVENOUS ONCE
Status: COMPLETED | OUTPATIENT
Start: 2024-06-28 | End: 2024-06-28

## 2024-06-28 RX ORDER — KETOROLAC TROMETHAMINE 30 MG/ML
15 INJECTION, SOLUTION INTRAMUSCULAR; INTRAVENOUS EVERY 6 HOURS
Status: DISCONTINUED | OUTPATIENT
Start: 2024-06-28 | End: 2024-06-29 | Stop reason: HOSPADM

## 2024-06-28 RX ADMIN — ONDANSETRON 4 MG: 2 INJECTION INTRAMUSCULAR; INTRAVENOUS at 03:42

## 2024-06-28 RX ADMIN — INSULIN LISPRO 4 UNITS: 100 INJECTION, SOLUTION INTRAVENOUS; SUBCUTANEOUS at 16:41

## 2024-06-28 RX ADMIN — SODIUM CHLORIDE, PRESERVATIVE FREE 10 ML: 5 INJECTION INTRAVENOUS at 09:46

## 2024-06-28 RX ADMIN — THIAMINE HYDROCHLORIDE 100 MG: 100 INJECTION, SOLUTION INTRAMUSCULAR; INTRAVENOUS at 09:45

## 2024-06-28 RX ADMIN — SODIUM CHLORIDE: 9 INJECTION, SOLUTION INTRAVENOUS at 09:43

## 2024-06-28 RX ADMIN — ENOXAPARIN SODIUM 40 MG: 100 INJECTION SUBCUTANEOUS at 22:34

## 2024-06-28 RX ADMIN — ENOXAPARIN SODIUM 40 MG: 100 INJECTION SUBCUTANEOUS at 09:45

## 2024-06-28 RX ADMIN — SODIUM CHLORIDE, PRESERVATIVE FREE 10 ML: 5 INJECTION INTRAVENOUS at 22:35

## 2024-06-28 RX ADMIN — CYANOCOBALAMIN 1000 MCG: 1000 INJECTION, SOLUTION INTRAMUSCULAR at 09:46

## 2024-06-28 RX ADMIN — PANTOPRAZOLE SODIUM 40 MG: 40 INJECTION, POWDER, FOR SOLUTION INTRAVENOUS at 09:45

## 2024-06-28 RX ADMIN — IOPAMIDOL 100 ML: 755 INJECTION, SOLUTION INTRAVENOUS at 08:48

## 2024-06-28 RX ADMIN — ACETAMINOPHEN 1000 MG: 650 SOLUTION ORAL at 03:49

## 2024-06-28 RX ADMIN — LOSARTAN POTASSIUM 100 MG: 50 TABLET, FILM COATED ORAL at 09:45

## 2024-06-28 RX ADMIN — KETOROLAC TROMETHAMINE 15 MG: 30 INJECTION, SOLUTION INTRAMUSCULAR; INTRAVENOUS at 09:46

## 2024-06-28 RX ADMIN — HYDROMORPHONE HYDROCHLORIDE 4 MG: 2 TABLET ORAL at 11:20

## 2024-06-28 RX ADMIN — LORAZEPAM 1 MG: 2 INJECTION INTRAMUSCULAR; INTRAVENOUS at 07:55

## 2024-06-28 RX ADMIN — KETOROLAC TROMETHAMINE 15 MG: 30 INJECTION, SOLUTION INTRAMUSCULAR; INTRAVENOUS at 14:39

## 2024-06-28 RX ADMIN — SODIUM CHLORIDE 1000 ML: 9 INJECTION, SOLUTION INTRAVENOUS at 08:10

## 2024-06-28 RX ADMIN — KETOROLAC TROMETHAMINE 15 MG: 30 INJECTION, SOLUTION INTRAMUSCULAR; INTRAVENOUS at 22:34

## 2024-06-28 RX ADMIN — DULOXETINE HYDROCHLORIDE 60 MG: 30 CAPSULE, DELAYED RELEASE ORAL at 09:51

## 2024-06-28 RX ADMIN — AMLODIPINE BESYLATE 10 MG: 5 TABLET ORAL at 09:45

## 2024-06-28 RX ADMIN — SIMETHICONE 120 MG: 80 TABLET, CHEWABLE ORAL at 03:09

## 2024-06-28 RX ADMIN — METOCLOPRAMIDE 10 MG: 5 INJECTION, SOLUTION INTRAMUSCULAR; INTRAVENOUS at 07:28

## 2024-06-28 ASSESSMENT — PAIN DESCRIPTION - LOCATION
LOCATION: ABDOMEN
LOCATION: ABDOMEN
LOCATION: ABDOMEN;INCISION

## 2024-06-28 ASSESSMENT — PAIN DESCRIPTION - DESCRIPTORS
DESCRIPTORS: ACHING
DESCRIPTORS: ACHING;SORE

## 2024-06-28 ASSESSMENT — PAIN DESCRIPTION - ORIENTATION: ORIENTATION: ANTERIOR

## 2024-06-28 ASSESSMENT — PAIN SCALES - GENERAL
PAINLEVEL_OUTOF10: 7
PAINLEVEL_OUTOF10: 4

## 2024-06-28 NOTE — CARE COORDINATION
Care Management Initial Assessment       RUR: 6% Low  Readmission? No  1st IM letter given? NA  1st  letter given: NA    CM met with patient, wife and daughter at bedside to introduce self and explain role. Patient sleeping soundly; information obtained from wife. Patient lives with his wife and 2 adult daughters in a 1 story home with 3 steps to enter. Patient ambulates with the use of a WC, RW and/or cane depending on the day due to MS. Patient was modified independent with ADL's. Wife responsible for IADL tasks and transportation. No history of HH or SNF/IPR. Patient's wife will provide transport home once medically stable.      06/28/24 1238   Service Assessment   Patient Orientation Unable to Assess  (Patient sleeping soundly; wife provided information with daughter in room as well.)   Cognition Alert   History Provided By Significant Other   Primary Caregiver Spouse   Accompanied By/Relationship Spouse   Support Systems Spouse/Significant Other;Children;Family Members   Patient's Healthcare Decision Maker is: Legal Next of Kin   PCP Verified by CM Yes  (WE[facility])   Last Visit to PCP Within last 3 months   Prior Functional Level Assistance with the following:;Cooking;Housework;Mobility   Current Functional Level Assistance with the following:;Mobility;Housework;Cooking;Toileting   Can patient return to prior living arrangement Yes   Ability to make needs known: Fair   Family able to assist with home care needs: Yes   Would you like for me to discuss the discharge plan with any other family members/significant others, and if so, who? Yes   Financial Resources Medicaid   Social/Functional History   Lives With Spouse;Daughter   Type of Home House   Home Layout One level   Home Access Stairs to enter with rails   Entrance Stairs - Number of Steps 3   Home Equipment Cane;Wheelchair - Manual;Walker - Rolling   Receives Help From Family   ADL Assistance Independent   Homemaking Assistance Needs

## 2024-06-28 NOTE — PROGRESS NOTES
Rapid reponse called    0751H: messaged Hannah Carroll PA regarding pt's report of dizziness and exhibits seizure-like symptoms. Hannah Carroll PA informed of pt's bp : 168/94. I asked PA if okay to give prn 1mg ativan prescribed for agitation. 0752H: Recommended consult. Informed PA of bp: 172/96 hr: 108    0754H: PA okays to give ativan.  0757H: requested order to run lactic acid.

## 2024-06-28 NOTE — DISCHARGE INSTR - DIET
Weight Loss Surgery Post Op information    You should drink 64 oz of water/clear liquids to prevent dehydration. Dehydration is the most common reason for readmission.    Sugar free clear liquids are acceptable to drink after surgery.   Sugar free, calorie free, non-carbonated beverage examples:  Water, Crystal light, sugar free Cannon, sugar free Gulshan-aid, Diet Snapple (non-caloric only), Non-carbonated water (Tuibw6B), decaf coffee or tea, fat free broth, sugar free popsicles, sugar free gelatin.    Hydration is the number one goal. It is more important to drink 64 oz of clear liquids than it is to get all 3 protein shakes in the first few days.        3.   You should have your meal replacement shake ready at home.   Shake Examples: Premier Protein, Orgain Protein, [place] Instant Breakfast Light Start (no sugar added), Boost Glucose Control, Bariatric Advantage or Unjury (mixed with milk), Boost Calorie Smart, Glucerna Hunger Smart, Ensure High Protein, Ensure Max Protein.     Aim for 2-3 shakes per day. Protein shakes do not count towards the 64 oz of clear liquid goal per day. You should be able to get 3 shakes per day by the end of your first week.      4. Sip! Do NOT gulp drink and NO STRAWS. Stop when full. Sip, sip, sip 64 oz clear liquids per day.                 It is okay to take sips of water and then sips of shake, rotating back and forth throughout the day. It is also okay for patients to designate 30 minutes just for a shake, as long as they are sipping plenty of clear liquids between shakes.       5. Continue to follow a full liquids diet after surgery until your first office visit. This will be around 2 weeks after discharge. Do NOT start the next phase until your doctor instructs you to at the office visit.       6. Have your vitamins ready to take at home. Refer to your book for when to take, brands, types, etc.              2 chewable Multivitamin              1200 mg Calcium citrate (600 mg,

## 2024-06-28 NOTE — PROGRESS NOTES
Patient has been sitting in the chair since 0630 as first ambulation after surgery. Asked if he was ready to go for hallway walk.Minim Assisted patient to get up from chair, with walker instructed the distance to walk and instructed guided that incase of dizziness or light headedness he will stop and then return to room, verbalized understanding.   Left room walking with Unit walker, Nurse asked patient how is tolerating he stated he was okay denied any dizziness until he turned the adrienne half way, patient eyes rolled up and he stated he was dizzy and his legs are weaker feels like he is about to fall, Nurse called for help placed patient in wheelchair rolled back to bed , At the entrance of the Patient's room  Patient started seizure activity lasted a second then tremors of the arms followed. Instructed patient to deep breath when the seizure stopped,  Patient stated that the room was spinning, Rapid Response Team was called.

## 2024-06-28 NOTE — SIGNIFICANT EVENT
Rapid Response Note     06/28/24 at 0747    A rapid response was called on this patient for Seizure like activity.    Response    Rapid Response Team at the bedside for evaluation.    Hannah DODSON responding at the bedside.    Mirella RAMIREZ is the primary nurse during this episode.    Situation    0747- The patient was walking with the nurse and began reporting dizziness and had presyncopal type symptoms. He was also noted to have jerking like movements and had to be helped back to bed.  He is semi alert with the arrival of the Rapid Response Team. He will open his eyes and have brief conversations. Moving upper extremities and able to follow commands.  Vital signs appear stable and do not correlate with his presenting symptoms.    0800- Provider at the bedside (Hannah Carroll NP) orders to call code stroke. Team alerted via overhead page.    0804- Code sepsis called as POC Lactic Acid level results at 3.90 mmol/L. Fluid bolus started.    0820- Non-Contrast CT of the head now.    0834- IV access obtained. Now CT with contrast studies.    0844- Patient arrives back in his room in [facility]. He is sleepy from the Ativan given earlier.     The patient was left in the care of his primary nursing team.    Rapid Response end time approximately 0850        Sepsis Screening  Are two or more SIRS criteria present? No    Is the patient's history suggestive of a new infection? No    Communication with provider:    Was a Code Sepsis called at this encounter? Yes, Code Sepsis called at 0804. Sepsis score at the time of the call 1.        Bedside [facility] Lab Values  Lactic level is 3.9 mmol/L

## 2024-06-28 NOTE — DISCHARGE INSTRUCTIONS
and/or iron supplement.  Multivitamin containing Iron - 2 multivitamins with 100% Daily Value of Iron, Folic Acid and Thiamine   Vitamin D-3 - Take 3000 IU  per day  Vitamin B-12 - Oral or Sublingual: 350-500 mcg/day OR 1000 mcg Monthly intramuscular shot        ACTIVITY    Be active.  Sit up as much as possible.  Walk often.  Walking and/or foot exercises will help prevent blood clots.  Continue to sip liquids throughout the day  Continue to use your incentive spirometer 4 to 5 times per day  Continue using your CPAP if previous prescribed.  Keep your incisions clean and dry to prevent infection.    Showering is ok.  No submersion in water for 2 weeks (No tubs, pools, etc.)  Weight lifting restrictions:  10 lbs. for the first 2 weeks, 20 lbs. for the next 4 to 6 weeks    TOP REASONS TO CONTACT YOUR SURGEONS'S OFFICE    You have severe pain or discomfort unrelieved by pain medication.  You have been vomiting for more than 24 hours.  Call sooner if you are unable to drink any fluids.  Temperature rises above 101.5 degrees.  You have persistent nausea and/or vomiting.  You are unable to swallow liquids   Increased swelling, redness, or drainage from your incision sites.

## 2024-06-28 NOTE — PROGRESS NOTES
checked on patient per RRT called. No family present at the time, and medical team attending to patient. Consulted with RN, and asked RN to call if support is needed.      Ongoing  support available as needed/requested.     Chaplain Adolfo, MDiv, [facility]  Sp[facility]  Paging service: [phone] (MARIMAR)

## 2024-06-28 NOTE — PROGRESS NOTES
Called to evaluate pt for AMS, acute visual changes and ? Sz activity  Pt was OOB walking with assistance and became dizzy with blurry vision and shaking, was put in wheelchair and back to bed, RR called   Pt has Hx of MS, this morning got reglan and has a scopolamine patch on . C/o HA, vision difficulty and does not want to keep eyes open   Seems confused but interactive and conversant.    BP (!) 148/92   Pulse (!) 107   Temp 98.8 °F (37.1 °C) (Oral)   Resp 16   Ht 1.854 m (6' 1\")   Wt (!) 138.8 kg (305 lb 14.4 oz)   SpO2 98%   BMI 40.36 kg/m²      EXAM    Pt is in bed with eyes closed  He tells me he is dizzy and his vision is not clear  On exam he is not toxic appearing  Follows commands, pupils equal and reactive  UE  is ok and equal  LE he cannot keep legs up which is his baseline   Pt cannot tell me what hospital he is at, tells me his age is 39, cannot tell me the month but knows it is summer time  Abdominal pain c/o   Abdomen is soft and appropriately tender with out peritoneal signs  LE no edema and NT  Lungs are clear anterior  CV pulse 108 regular, EKG sinus tach  Afebrile   BP ok  Head CT unremarkable  WBC 11  HGB stable  Lactic acit 3.9    A/p POD 1 s/p routine lap ELLIOTT procedure for tx of MO with AMS, visual changes and ? Sz activity/ shaking    Seen by Neuro at bedside  Does not rule in for sepsis despite elevated lactic acid  He got Reglan and did have scopolamine patch on... DC both of these, DC prn compazine, DC Neurontin, continue his routine MS mediations   Repeat Lactic acid level  Neuro checks  Close monitoring  Abdomen is benign, does not seem to be having acute surgical complication, may be due to MS  issues at baseline and medication interactions  Of note he has LE weakness at baseline dt MS and did have urinary incontinence yesterday before receiving anesthesia so has some neurologic issues at baseline   Pt discussed with Dr. Tobin, he had seen pt earlier this morning, see his

## 2024-06-28 NOTE — CONSULTS
NUTRITION    Chart reviewed. Spoke to JAMES Vu prior to edu considering rapid response this AM, per RN pt sleepy but family in room and is appropriate for visit. Pt wife in room stated she had gastric sleeve 6 weeks ago and is very familiar with Post-op bariatric diet. Instruction completed.  Will gladly follow up for additional questions as needed.  Thank you.     Mell Salgado RD, BHAVIN   ---------------------------------------------------------------------------------------------------------------------------------------------------------------------------------------------------------------------------------------------------------------------------------------

## 2024-06-28 NOTE — PROGRESS NOTES
Surgery Progress Note    6/28/2024    Admit Date: 6/27/2024  6:37 AM    CC: Abd pain and nausea    POD: 1 Elliott    Subjective:     Doing well but with pain and nausea.    Constitutional: No fever or chills  Neurologic: No headache  Eyes: No scleral icterus or irritated eyes  Nose: No nasal pain or drainage  Mouth: No oral lesions or sore throat  Cardiac: No palpations or chest pain  Pulmonary: No cough or shortness of breath  Gastrointestinal: Pain, nausea, no emesis, diarrhea, or constipation  Genitourinary: No dysuria  Musculoskeletal: No muscle or joint tenderness  Skin: No rashes or lesions  Psychiatric: No anxiety or depressed mood    Objective:     Vitals:    06/28/24 0555   BP:    Pulse: 86   Resp:    Temp:    SpO2:        General: No acute distress, conversant  Eyes: PERRLA, no scleral icterus  HENT: Normocephalic without oral lesions  Neck: Trachea midline without LAD  Cardiac: Normal pulse rate and rhythm  Pulmonary: Symmetric chest rise with normal effort  GI: Soft, ATTP, wound cdi  Skin: Warm without rash  Extremities: No edema or joint stiffness  Psych: Appropriate mood and affect    Labs, vital signs, and I/O reviewed.    Assessment:     49 y/o M s/p ELLIOTT doing well    Plan:     PRN pain meds. Toradol added  IVF  Reese fulls. Antiemetics  Lovenox  Ambulate  Cont floor    Ravi Tobin MD, FACS, [facility]  Bariatric and General Surgeon  [facility]

## 2024-06-28 NOTE — PROGRESS NOTES
Neurocritical Care Code Stroke Documentation      Symptoms: Acute AMS, Dizziness    Baseline mRS:  3   Last Known Well: 0730    Medical hx: Past Medical History:   Diagnosis Date    Autoimmune disease (HCC) 2014    Multiple sclerosis    BPH (benign prostatic hyperplasia)     DM (diabetes mellitus) (HCC) 2011    HTN (hypertension) 2011    Hypercholesterolemia     Obesity       Vitals: Vitals:    24 0800   BP: (!) 148/92   Pulse: (!) 107   Resp:    Temp: 98.8 °F (37.1 °C)   SpO2: 98%      Anticoagulation:  None    VAN:  Negative   NIHSS:   1a-LOC:1    1b-Month/Age:1    1c-Open/Close Hand:0    2-Best Gaze:0    3-Visual Fields:0    4-Facial Palsy:0    5a-Left Arm:0    5b-Right Arm:0    6a-Left Le    6b-Right Le    7-Limb Ataxia:0    8-Sensory:0    9-Best Language:2    10-Dysarthria:0    11-Extinction/Inattention:0  TOTAL SCORE: 8   Imaging (personally reviewed):   CT Head: No acute pathology    CT Angio Head and Neck: No LVO      Plan:   TNK Candidate: NO    Mechanical thrombectomy Candidate: NO    *Perform dysphagia screening prior to any PO intake*     Discussed with: Tele Neurology Dr. Spivey     Arrival time: 0800    I have spent 30 minutes of critical care time involved in lab review, consultations with specialist, family decision making and documentation. During this entire length of time I was immediately available to the patient.    Enedina Rowan, APRN - CNP  Neurocritical Care Nurse Practitioner

## 2024-06-28 NOTE — PROGRESS NOTES
Pt is much improved,  No acute c/o and resting comfortably   Has no memory of events this AM  Has had some clears, no n/v  Family Is at the beside and feel he is at his baseline   His only c/o is some post op pain 7/10 and he is due for some pain medication  BP (!) 151/88   Pulse 89   Temp 98.2 °F (36.8 °C) (Oral)   Resp 18   Ht 1.854 m (6' 1\")   Wt (!) 138.8 kg (305 lb 14.4 oz)   SpO2 99%   BMI 40.36 kg/m²   He is awake, alert and appropriate, interactive, following commands, vision back to normal, oriented and in good spirits.   OK for oral analgesics and up to chair  Recheck lactic acid this afternoon   Likely had medication reaction/ side effect  Continue bariatric pathway   Hannah Carroll, PA

## 2024-06-28 NOTE — PROGRESS NOTES
Wife called. CT head normal. Change fluid to NS and bolus NS. Do not suspect post op leak or complication at this time. Repeat lactate 2 PM. Move to step down. Appreciate neurology help.    Ravi Tobin MD, FACS, [facility]  Bariatric and General Surgeon  [facility]

## 2024-06-29 VITALS
OXYGEN SATURATION: 95 % | WEIGHT: 305.9 LBS | HEIGHT: 73 IN | HEART RATE: 97 BPM | DIASTOLIC BLOOD PRESSURE: 95 MMHG | TEMPERATURE: 98.6 F | BODY MASS INDEX: 40.54 KG/M2 | SYSTOLIC BLOOD PRESSURE: 159 MMHG | RESPIRATION RATE: 18 BRPM

## 2024-06-29 LAB
ANION GAP SERPL CALC-SCNC: 5 MMOL/L (ref 5–15)
BASOPHILS # BLD: 0 K/UL (ref 0–0.1)
BASOPHILS NFR BLD: 0 % (ref 0–1)
BUN SERPL-MCNC: 13 MG/DL (ref 6–20)
BUN/CREAT SERPL: 14 (ref 12–20)
CALCIUM SERPL-MCNC: 8.5 MG/DL (ref 8.5–10.1)
CHLORIDE SERPL-SCNC: 108 MMOL/L (ref 97–108)
CO2 SERPL-SCNC: 24 MMOL/L (ref 21–32)
CREAT SERPL-MCNC: 0.91 MG/DL (ref 0.7–1.3)
DIFFERENTIAL METHOD BLD: ABNORMAL
EOSINOPHIL # BLD: 0 K/UL (ref 0–0.4)
EOSINOPHIL NFR BLD: 0 % (ref 0–7)
ERYTHROCYTE [DISTWIDTH] IN BLOOD BY AUTOMATED COUNT: 13.5 % (ref 11.5–14.5)
GLUCOSE BLD STRIP.AUTO-MCNC: 165 MG/DL (ref 65–117)
GLUCOSE BLD STRIP.AUTO-MCNC: 169 MG/DL (ref 65–117)
GLUCOSE SERPL-MCNC: 156 MG/DL (ref 65–100)
HCT VFR BLD AUTO: 34.6 % (ref 36.6–50.3)
HGB BLD-MCNC: 11.7 G/DL (ref 12.1–17)
IMM GRANULOCYTES # BLD AUTO: 0 K/UL (ref 0–0.04)
IMM GRANULOCYTES NFR BLD AUTO: 0 % (ref 0–0.5)
LYMPHOCYTES # BLD: 1.7 K/UL (ref 0.8–3.5)
LYMPHOCYTES NFR BLD: 18 % (ref 12–49)
MCH RBC QN AUTO: 26.2 PG (ref 26–34)
MCHC RBC AUTO-ENTMCNC: 33.8 G/DL (ref 30–36.5)
MCV RBC AUTO: 77.4 FL (ref 80–99)
MONOCYTES # BLD: 0.6 K/UL (ref 0–1)
MONOCYTES NFR BLD: 6 % (ref 5–13)
NEUTS SEG # BLD: 6.9 K/UL (ref 1.8–8)
NEUTS SEG NFR BLD: 76 % (ref 32–75)
NRBC # BLD: 0 K/UL (ref 0–0.01)
NRBC BLD-RTO: 0 PER 100 WBC
PLATELET # BLD AUTO: 242 K/UL (ref 150–400)
PMV BLD AUTO: 10.7 FL (ref 8.9–12.9)
POTASSIUM SERPL-SCNC: 4.2 MMOL/L (ref 3.5–5.1)
RBC # BLD AUTO: 4.47 M/UL (ref 4.1–5.7)
SERVICE CMNT-IMP: ABNORMAL
SERVICE CMNT-IMP: ABNORMAL
SODIUM SERPL-SCNC: 137 MMOL/L (ref 136–145)
WBC # BLD AUTO: 9.1 K/UL (ref 4.1–11.1)

## 2024-06-29 PROCEDURE — 80048 BASIC METABOLIC PNL TOTAL CA: CPT

## 2024-06-29 PROCEDURE — 85025 COMPLETE CBC W/AUTO DIFF WBC: CPT

## 2024-06-29 PROCEDURE — 82962 GLUCOSE BLOOD TEST: CPT

## 2024-06-29 PROCEDURE — 6360000002 HC RX W HCPCS: Performed by: SURGERY

## 2024-06-29 PROCEDURE — C9113 INJ PANTOPRAZOLE SODIUM, VIA: HCPCS | Performed by: SURGERY

## 2024-06-29 PROCEDURE — 6370000000 HC RX 637 (ALT 250 FOR IP): Performed by: SURGERY

## 2024-06-29 PROCEDURE — 2580000003 HC RX 258: Performed by: SURGERY

## 2024-06-29 PROCEDURE — 36415 COLL VENOUS BLD VENIPUNCTURE: CPT

## 2024-06-29 RX ADMIN — KETOROLAC TROMETHAMINE 15 MG: 30 INJECTION, SOLUTION INTRAMUSCULAR; INTRAVENOUS at 10:11

## 2024-06-29 RX ADMIN — AMLODIPINE BESYLATE 10 MG: 5 TABLET ORAL at 10:10

## 2024-06-29 RX ADMIN — ENOXAPARIN SODIUM 40 MG: 100 INJECTION SUBCUTANEOUS at 10:11

## 2024-06-29 RX ADMIN — LOSARTAN POTASSIUM 100 MG: 50 TABLET, FILM COATED ORAL at 10:11

## 2024-06-29 RX ADMIN — DULOXETINE HYDROCHLORIDE 60 MG: 30 CAPSULE, DELAYED RELEASE ORAL at 10:11

## 2024-06-29 RX ADMIN — SODIUM CHLORIDE, PRESERVATIVE FREE 10 ML: 5 INJECTION INTRAVENOUS at 10:11

## 2024-06-29 RX ADMIN — PANTOPRAZOLE SODIUM 40 MG: 40 INJECTION, POWDER, FOR SOLUTION INTRAVENOUS at 10:11

## 2024-06-29 ASSESSMENT — PAIN SCALES - GENERAL
PAINLEVEL_OUTOF10: 0
PAINLEVEL_OUTOF10: 3
PAINLEVEL_OUTOF10: 0

## 2024-06-29 ASSESSMENT — PAIN DESCRIPTION - DESCRIPTORS: DESCRIPTORS: ACHING

## 2024-06-29 ASSESSMENT — PAIN DESCRIPTION - LOCATION: LOCATION: GENERALIZED

## 2024-06-29 NOTE — PROGRESS NOTES
Admit Date: 6/27/2024  POD 3 s/p ELLIOTT     Subjective:     Patient without complaints.  His pain is well controled and he is tolerating diet     Scheduled Meds:   ketorolac  15 mg IntraVENous Q6H    amLODIPine  10 mg Oral Daily    dalfampridine  10 mg Oral Q12H    DULoxetine  60 mg Oral Daily    losartan  100 mg Oral Daily    insulin lispro  0-16 Units SubCUTAneous TID WC    insulin lispro  0-4 Units SubCUTAneous Nightly    sodium chloride flush  5-40 mL IntraVENous 2 times per day    pantoprazole (PROTONIX) 40 mg in sodium chloride (PF) 0.9 % 10 mL injection  40 mg IntraVENous Daily    enoxaparin  40 mg SubCUTAneous 2 times per day     Continuous Infusions:   sodium chloride 100 mL/hr at 06/29/24 0525    dextrose      sodium chloride       PRN Meds:glucose, dextrose bolus **OR** dextrose bolus, glucagon (rDNA), dextrose, sodium chloride flush, sodium chloride, HYDROmorphone **OR** HYDROmorphone, HYDROmorphone, hydrOXYzine HCl, ondansetron **OR** ondansetron, acetaminophen, diphenhydrAMINE, cyclobenzaprine, hydrALAZINE, simethicone, hyoscyamine, LORazepam      Objective:     Patient Vitals for the past 8 hrs:   BP Temp Temp src Pulse Resp SpO2   06/29/24 1400 -- -- -- 97 -- --   06/29/24 1200 -- -- -- (!) 105 -- --   06/29/24 1000 -- -- -- 97 -- --   06/29/24 0813 (!) 159/95 98.6 °F (37 °C) Oral (!) 103 18 95 %     I/O last 3 completed shifts:  In: 1782.5 [P.O.:720; I.V.:1062.5]  Out: 3825 [Urine:3825]  No intake/output data recorded.    Gen: very comfortable   Neuro: A/O x 3  Resp non-labored   Abd: soft, non-distended, minor post op tenderness   Ext warm     CBC is normal     Assessment:     Principal Problem:    Morbid obesity (HCC)  Resolved Problems:    * No resolved hospital problems. *      Plan:     Per Neurology note, they don't feel further investigations are warranted    Patient without fevers/ pain is well controlled and he is tolerating diet      Will discharge home

## 2024-06-30 DIAGNOSIS — I10 ESSENTIAL HYPERTENSION: ICD-10-CM

## 2024-06-30 LAB
BACTERIA SPEC CULT: NORMAL
SERVICE CMNT-IMP: NORMAL

## 2024-07-01 ENCOUNTER — TELEPHONE (OUTPATIENT)
Age: 49
End: 2024-07-01

## 2024-07-01 RX ORDER — OLMESARTAN MEDOXOMIL 40 MG/1
40 TABLET ORAL DAILY
Qty: 90 TABLET | Refills: 1 | Status: SHIPPED | OUTPATIENT
Start: 2024-07-01

## 2024-07-01 NOTE — TELEPHONE ENCOUNTER
Bariatric Post Op Call 48 hour    Hydration: Less than 32 ounces of water daily is fair to poor (Goal is 64 ounces per day)  Poor [] Fair []  Good [x] Great []    Amount: 55 ounces on Sunday and reported consuming 49 ounces today at this time.     Comment:     Ambulation:( walking throughout the day, at least every 2 hours while awake. Patient should be up and out of bed most of the day.)   Poor [] Fair []  Good [] Great [x]    Comment:Expressed the importance of getting up every couple of hours.     Urine Color: Question of any odor and color (should be jen, pale, and clear)   Dark [] Jen [] Pale [] Clear [x]    Comment:No Odor    Diet: Question any nausea and/or vomiting.            Protein intake (ultimately goal is 60 grams of protein daily, but at 2 days post op they should be working towards this and may not be at goal yet)  Poor [] Fair []  Good [] Great [x]    Comment: Reported getting in both shakes. No nausea and or vomiting noted.       Bowel movements: Question of any constipation- haven't had any bowel movements for more than 3 days. This could be related to protein intake and/or narcotic pain medication usage.     Comment: No BM, started Miralax yesterday. Taking once a day. I told him if no BM by midday tomorrow start Miralax twice a day. If no BM by Wednesday add a dose of MOM. I will call you later during the week to check on you.          Use of incentive spirometer:  Yes [x]  No []    Comment:     Incision: (No redness, pain, swelling or fever)     Healing Well [x] Redness [] Pain [] Drainage [] Swelling []    Comment: Fever    Pain: Right sided incisional (usually the largest incision with deep stitch) abdominal pain is normal (should be less than 3).  Pain 0 - 10: 3    Comment (are they taking pain medication and is it helping? Abdominal support / splinting/ ice or heat?): Taking Oxycodone.     Referred to provider: No     Next appointment: 7/11/24      Red Flags = prompt referral to a

## 2024-07-01 NOTE — TELEPHONE ENCOUNTER
Refill request received from [facility]      for   Requested Prescriptions     Pending Prescriptions Disp Refills    olmesartan (BENICAR) 40 MG tablet [Pharmacy Med Name: Olmesartan Medoxomil 40mg Tablet] 90 tablet 0     Sig: Take 1 tablet by mouth daily.     Last office visit: 5/1/2024   Next office visit: 8/2/2024     Routed to [place] for review.     Hayde Rizo LPN

## 2024-07-03 LAB
BACTERIA SPEC CULT: NORMAL
SERVICE CMNT-IMP: NORMAL

## 2024-07-11 ENCOUNTER — OFFICE VISIT (OUTPATIENT)
Age: 49
End: 2024-07-11

## 2024-07-11 VITALS
TEMPERATURE: 98.8 F | WEIGHT: 286 LBS | DIASTOLIC BLOOD PRESSURE: 73 MMHG | SYSTOLIC BLOOD PRESSURE: 120 MMHG | BODY MASS INDEX: 37.91 KG/M2 | HEART RATE: 101 BPM | HEIGHT: 73 IN | RESPIRATION RATE: 18 BRPM | OXYGEN SATURATION: 94 %

## 2024-07-11 DIAGNOSIS — K91.2 POSTOPERATIVE INTESTINAL MALABSORPTION: ICD-10-CM

## 2024-07-11 DIAGNOSIS — E66.01 SEVERE OBESITY (BMI 35.0-39.9) WITH COMORBIDITY (HCC): ICD-10-CM

## 2024-07-11 DIAGNOSIS — Z09 SURGICAL FOLLOW-UP CARE: Primary | ICD-10-CM

## 2024-07-11 PROCEDURE — 99024 POSTOP FOLLOW-UP VISIT: CPT | Performed by: NURSE PRACTITIONER

## 2024-07-11 ASSESSMENT — PATIENT HEALTH QUESTIONNAIRE - PHQ9
SUM OF ALL RESPONSES TO PHQ QUESTIONS 1-9: 0
2. FEELING DOWN, DEPRESSED OR HOPELESS: NOT AT ALL
SUM OF ALL RESPONSES TO PHQ QUESTIONS 1-9: 0
1. LITTLE INTEREST OR PLEASURE IN DOING THINGS: NOT AT ALL
SUM OF ALL RESPONSES TO PHQ9 QUESTIONS 1 & 2: 0
SUM OF ALL RESPONSES TO PHQ QUESTIONS 1-9: 0
SUM OF ALL RESPONSES TO PHQ QUESTIONS 1-9: 0

## 2024-07-11 NOTE — PROGRESS NOTES
Identified patient with two patient identifiers (name and ). Reviewed chart in preparation for visit and have obtained necessary documentation.    Miguel Carroll is a 48 y.o. male  Chief Complaint   Patient presents with    Post-Op Check     2 WEEK S/P ROBOTIC SLEEVE GASTRECTOMY SINGLE ANASTOMOSIS DUODENAL ILEOSTOMY *ERAS*     /73 (Site: Right Upper Arm, Position: Sitting, Cuff Size: Medium Adult)   Pulse (!) 101   Temp 98.8 °F (37.1 °C) (Oral)   Resp 18   Ht 1.854 m (6' 1\")   Wt 129.7 kg (286 lb)   SpO2 94%   BMI 37.73 kg/m²     1. Have you been to the ER, urgent care clinic since your last visit?  Hospitalized since your last visit?no    2. Have you seen or consulted any other health care providers outside of the [facility] since your last visit?  Include any pap smears or colon screening. no

## 2024-07-11 NOTE — PATIENT INSTRUCTIONS
Continue to use your protein powder and or shakes every day to meet the 60 grams / day protein goal (minimum)    Liquids are still a priority and aim for at least 64 oz water or other approved clear liquid every day       In addition to everything on the Bariatric Liquid diet, you may   add these foods to your diet.  Protein - include with every meal  Egg or egg substitute    Low fat or fat-free cottage cheese    Low fat or fat-free yogurt   Low fat Greek yogurt   Fat-free, 1% milk, or Lactaid milk   Low-fat or vegetarian refried beans   Well-cooked beans and lentils  Fat-free or 2% reduced-fat cheese   Hummus   Low fat soup     Snacks/Other Options:  Sugar free fudgsicles   Sugar free cocoa   No sugar added pudding     Fruits and Vegetables  Applesauce (no sugar added)  Canned fruit (no sugar added)  Fresh soft peeled fruits (melons, banana, avocado, berries)  Any soft cooked vegetables   Mashed potatoes, Sweet potatoes, baked potatoes (no skin)    Condiments  Fat free non-stick spray  Herbs and spices  Lite butter, margarine, canola oil, olive oil  Reduced-fat or fat-free agudelo  Reduced-fat or fat-free salad dressing  Reduced-fat or fat-free cream cheese  Reduced-fat or fat-free sour cream  Lemon juice  Salt, pepper, mustard, ketchup, salsa    Prepare food to the appropriate texture.    Sample Meal Plan:  Soft and Mushy    Breakfast ½ cup plain oatmeal with protein powder. Add cinnamon, nutmeg, Splenda brown sugar as desired for flavor 20-25 grams protein   Snack (optional) High protein gelatin (recipe on www.unjury.com) 10 grams protein   Lunch ½ cup low fat cottage cheese or Greek yogurt with soft fruit 10 - 15 grams protein    Snack (optional) High protein pudding or high protein popsicle (recipe on www.Roomtag.com) 10 grams protein   Dinner ¼ cup low-fat well cooked beans with low-fat cheese sprinkled on top  ¼ cup no sugar added applesauce (can sprinkle protein powder) 5-8 grams protein  5-10  grams protein

## 2024-07-11 NOTE — PROGRESS NOTES
Chief Complaint   Patient presents with    Post-Op Check     2 WEEK S/P ROBOTIC SLEEVE GASTRECTOMY SINGLE ANASTOMOSIS DUODENAL ILEOSTOMY *ERAS*       HPI: Miguel Carroll presents today for obesity management 2 weeks following a SAD-I.  He reports he is \"feeling great and I can freeze so much easier and do not feel so heavy\".  He is wearing a shirt today that used to be very tight on him and that makes him feel good.  Reports blood sugars have been less than 110 on no insulin.    No further incidents like experience in the hospital.  No falls    2 weeks status post: [] Gastric bypass for treatment of morbid obesity   [] Sleeve gastrectomy for treatment of morbid obesity   [] Conversion sleeve to gastric bypass   [] Conversion lap band to gastric bypass   [x] SAD-I  [] DS  [] Revision      Presents today for [x] Obesity management   [] Weight regain   [] Abdominal pain   [] Medication management       [x] No fever or chills, chest pain or shortness of breath.    Pain:  Yes   Yes, Location mild right-sided abdominal discomfort.  Its at the larger incision.  \"Nothing major and I am not taking any pain medication\".     Taking any pain relieving medications No          Yes Meeting protein goals (60 grams minimum per day)  [x] Shakes he has been using Premier protein shakes 2/day [] Bars [x] Other has been blending things like salmon and other foods and drinking them   Yes Meeting hydration goals (64 oz minimum daily) \"very close and I get about 55 to 58 ounces per day\"    [x] Water [] Juices [] Sugar-free add-ins [] Electrolyte drink/mix    Yes Tracking intake        Bowels moving  [x] Most days   [] Few times per week       Constipated   No    Using laxatives  No      Nausea   No    Regurgitation   No      Vitamin compliance  Yes      [x] Bariatric MVI 45 mg iron  [] Other vitamin regimen   [x] Calcium Citrate 1200 -1500 mg daily in divided doses   [] B12 injections 1000 mcg/ml   [] Prescription D2 50,000 iu  []

## 2024-07-18 ENCOUNTER — TELEPHONE (OUTPATIENT)
Age: 49
End: 2024-07-18

## 2024-07-18 NOTE — TELEPHONE ENCOUNTER
be seen in the office as soon as possible or refer to the provider for follow-up.  Reinforce to patient to use bariatric educational booklet as guide.  It is appropriate to refer patient to the nutritionist to discuss more in detail of diet and nutrition.

## 2024-07-25 ENCOUNTER — TELEMEDICINE (OUTPATIENT)
Age: 49
End: 2024-07-25

## 2024-07-25 VITALS — HEIGHT: 73 IN | WEIGHT: 278 LBS | BODY MASS INDEX: 36.84 KG/M2

## 2024-07-25 DIAGNOSIS — E66.01 SEVERE OBESITY (BMI 35.0-39.9) WITH COMORBIDITY (HCC): Primary | ICD-10-CM

## 2024-07-25 DIAGNOSIS — Z09 SURGICAL FOLLOW-UP CARE: ICD-10-CM

## 2024-07-25 PROCEDURE — 99024 POSTOP FOLLOW-UP VISIT: CPT | Performed by: NURSE PRACTITIONER

## 2024-07-25 NOTE — PROGRESS NOTES
Identified pt with two pt identifiers (name and ). Reviewed chart in preparation for visit and have obtained necessary documentation.    Miguel Carroll is a 48 y.o. male  Chief Complaint   Patient presents with    Weight Management     4 weeks s/p Lap Gastric Sleeve     Ht 1.854 m (6' 1\")   Wt 126.1 kg (278 lb)   BMI 36.68 kg/m²     1. Have you been to the ER, urgent care clinic since your last visit?  Hospitalized since your last visit?no    2. Have you seen or consulted any other health care providers outside of the [facility] since your last visit?  Include any pap smears or colon screening. no

## 2024-07-25 NOTE — PROGRESS NOTES
4 weeks status post  robotic ELLIOTT.   Pt reports doing well on liquids and soft foods.  .    No complaints of pain.   Pt reports no nausea and no vomiting  Heis drinking approximately 64 oz of water daily.   He is drinking and eating 50-60 grams of protein daily.       He is taking all bariatric vitamins without issue.     Total weight loss since surgery 27lbs  Weight loss since last visit 8lbs    Ht 1.854 m (6' 1\")   Wt 126.1 kg (278 lb)   BMI 36.68 kg/m²          Mr. Carroll has a reminder for a \"due or due soon\" health maintenance. I have asked that he contact his primary care provider for follow-up on this health maintenance.      Physical Examination: General appearance - alert, well appearing, and in no distress,  Chest - no respiratory distress.     Abdomen - incision healing well per patient.     A/P    Doing well 4 weeks  Status post robotic ELLIOTT  Diet advanced to soft meats  Focus on 50-60 grams of protein daily.   Supplement with unflavored protein powder daily.   Encouraged water intake to at least 64 oz of non-carbonated/no calorie beverages.  Continue vitamins.    Discontinue  PPI  No lifting greater than 40 lbs  Follow up 2 weeks     Pt verbalized understanding and questions were answered to the best of my knowledge and ability.    diet educational materials were provided.      Miguel Carroll, was evaluated through a synchronous (real-time) audio-video encounter. The patient (or guardian if applicable) is aware that this is a billable service, which includes applicable co-pays. This Virtual Visit was conducted with patient's (and/or legal guardian's) consent. Patient identification was verified, and a caregiver was present when appropriate.   The patient was located at Home: [address] [place] [id number]  Provider was located at Facility (Appt Dept): [address]  [place] [address]  Confirm you are appropriately licensed, registered, or certified to

## 2024-07-25 NOTE — PATIENT INSTRUCTIONS
What you need to know:  1 .Advance your diet to moist meats. Follow the handout that you were given today in the office.   2. Take the recommended vitamins daily  3 No lifting greater than 40 lbs.   4. You can do light jogging, moderate walking and a recumbent bike.   5 Follow up in 2 weeks.  6. You may go into a pool.   7. If you are not able to tolerate liquids, soft foods or moist meats.   Please call our office. [phone]  8. If you have vomiting and persistent epigastric pain or chest pain. You should call our office, the doctor on-call or go to the emergency room.         Constipation  Benefiber, Miralax & similar (once or twice daily)  Milk of Magnesia (daily as needed)  Dulcolax suppository  Fleets Enema    Moist Meats   What is this diet?  This phase adds moist meats in addition to foods in Soft & Mushy  Lean protein sources  When do I begin?  When directed by your NP or surgeon, usually 4 weeks after surgery          What new foods can I eat?  Moist meat and poultry  Moist fish and seafood          Shopping Lists      Protein - include with every meal  Tuna packed in water   [place] (canned, frozen, fresh)   White flaky fish (sukumar, cod, flounder, tilapia)   Canned chicken packed in water    96-99% fat free thinly sliced deli meat (ham, turkey, chicken)   Silken Tofu   Skinless turkey or chicken (prepare to a soft texture)   Lean ground meat  Lean pork (cooked until very tender, cut into small pieces)     Sample Meal Plan:  Moist Meats    Breakfast ½ cup soft cooked eggs (2) 16 grams protein   Snack (optional) Low-fat string cheese 5 grams protein   Lunch 2 slices of lean deli turkey (2 oz.), ¼ cup soft fruit or  ½ cup tuna salad made with low-fat mayonnaise 14 grams protein   14 grams protein   Snack (optional) Greek yogurt or low-fat cottage cheese or low-fat string cheese 8-15 grams protein   Dinner Soft/flaky fish (2 oz.)  ¼ cup soft cooked vegetables 14 grams protein

## 2024-08-02 ENCOUNTER — TELEPHONE (OUTPATIENT)
Age: 49
End: 2024-08-02

## 2024-08-02 ENCOUNTER — TELEMEDICINE (OUTPATIENT)
Age: 49
End: 2024-08-02

## 2024-08-02 DIAGNOSIS — E66.01 MORBID OBESITY (HCC): ICD-10-CM

## 2024-08-02 DIAGNOSIS — E11.42 TYPE 2 DIABETES MELLITUS WITH DIABETIC POLYNEUROPATHY, WITHOUT LONG-TERM CURRENT USE OF INSULIN (HCC): Primary | ICD-10-CM

## 2024-08-02 DIAGNOSIS — Z98.84 BARIATRIC SURGERY STATUS: ICD-10-CM

## 2024-08-02 ASSESSMENT — ENCOUNTER SYMPTOMS
COUGH: 0
SHORTNESS OF BREATH: 0

## 2024-08-02 NOTE — PROGRESS NOTES
I have reviewed all needed documentation in preparation for visit. Verified patient by name and date of birth  Chief Complaint   Patient presents with    3 Month Follow-Up     No Concerns        There were no vitals filed for this visit.    Health Maintenance Due   Topic Date Due    Hepatitis B vaccine (1 of 3 - 3-dose series) Never done    DTaP/Tdap/Td vaccine (1 - Tdap) Never done    Diabetic retinal exam  02/13/2015    Diabetic foot exam  09/03/2022    Pneumococcal 0-64 years Vaccine (2 of 2 - PCV) 09/17/2022    COVID-19 Vaccine (2 - 2023-24 season) 09/01/2023    Flu vaccine (1) 08/01/2024     \"Have you been to the ER, urgent care clinic since your last visit?  Hospitalized since your last visit?\"    NO    “Have you seen or consulted any other health care providers outside of [facility] since your last visit?”    NO          Click Here for Release of Records Request         Rusty Brand [facility]

## 2024-08-02 NOTE — PROGRESS NOTES
Miguel Carroll is a 48 y.o. male who was seen by synchronous (real-time) audio-video technology on 8/2/2024    Consent: Miguel Carroll, who was seen by synchronous (real-time) audio-video technology, and/or his healthcare decision maker, is aware that this patient-initiated, Telehealth encounter on 8/2/2024 is a billable service, with coverage as determined by his insurance carrier. He is aware that he may receive a bill and has provided verbal consent to proceed: YES  Subjective:   Miguel Carroll is a 48 y.o. male who was seen for 3 Month Follow-Up (No Concerns )    DM II - Post gastric bypass surgery controlled without medication! FBS and end of day glucose readings in 100 range. Not eating carbs. Feeling well.   Hemoglobin A1C (%)   Date Value   06/27/2024 7.3 (H)     Lab Results   Component Value Date     (H) 12/04/2023     Creatinine (MG/DL)   Date Value   06/29/2024 0.91       Currently taking no Diabetes Meds!  Diabetic Medications       Insulin       insulin glargine (BASAGLAR KWIKPEN) 100 UNIT/ML injection pen Inject 50 units under the skin every morning for type 2 diabetes mellitus.     Patient not taking: Reported on 8/2/2024          268 lbs now!  Wt Readings from Last 3 Encounters:   07/25/24 126.1 kg (278 lb)   07/11/24 129.7 kg (286 lb)   06/27/24 (!) 138.8 kg (305 lb 14.4 oz)     Review of Systems   Constitutional:  Negative for fever.   Respiratory:  Negative for cough and shortness of breath.    Cardiovascular:  Negative for chest pain and palpitations.   Neurological:  Negative for headaches.   Psychiatric/Behavioral:  Negative for dysphoric mood.      Objective:         8/2/2024     7:33 AM   Patient-Reported Vitals   Patient-Reported Weight 268   Patient-Reported Height 6'1   Patient-Reported Systolic 162 mmHg   Patient-Reported Diastolic 88 mmHg      General: alert, cooperative, no distress   Mental  status: normal mood, behavior, speech, dress, motor activity, and thought

## 2024-08-09 ENCOUNTER — TELEMEDICINE (OUTPATIENT)
Age: 49
End: 2024-08-09

## 2024-08-09 ENCOUNTER — TELEPHONE (OUTPATIENT)
Age: 49
End: 2024-08-09

## 2024-08-09 VITALS — BODY MASS INDEX: 35.78 KG/M2 | WEIGHT: 270 LBS | HEIGHT: 73 IN

## 2024-08-09 DIAGNOSIS — Z98.890 POST-OPERATIVE STATE: Primary | ICD-10-CM

## 2024-08-09 PROCEDURE — 99024 POSTOP FOLLOW-UP VISIT: CPT | Performed by: SURGERY

## 2024-08-09 ASSESSMENT — PATIENT HEALTH QUESTIONNAIRE - PHQ9
SUM OF ALL RESPONSES TO PHQ QUESTIONS 1-9: 0
SUM OF ALL RESPONSES TO PHQ9 QUESTIONS 1 & 2: 0
SUM OF ALL RESPONSES TO PHQ QUESTIONS 1-9: 0
1. LITTLE INTEREST OR PLEASURE IN DOING THINGS: NOT AT ALL
2. FEELING DOWN, DEPRESSED OR HOPELESS: NOT AT ALL
SUM OF ALL RESPONSES TO PHQ QUESTIONS 1-9: 0
SUM OF ALL RESPONSES TO PHQ QUESTIONS 1-9: 0

## 2024-08-09 NOTE — PROGRESS NOTES
Identified patient with two patient identifiers (name and ). Reviewed chart in preparation for visit and have obtained necessary documentation.    Miguel Carroll is a 48 y.o. male  Chief Complaint   Patient presents with    Post-Op Check     6 weeks s/p ROBOTIC SLEEVE GASTRECTOMY SINGLE ANASTOMOSIS DUODENAL ILEOSTOMY (E    Obesity     Ht 1.854 m (6' 1\")   Wt 122.5 kg (270 lb)   BMI 35.62 kg/m²     1. Have you been to the ER, urgent care clinic since your last visit?  Hospitalized since your last visit?no    2. Have you seen or consulted any other health care providers outside of the [facility] since your last visit?  Include any pap smears or colon screening. no  
help with drier meats at times.  We will arrange a 4-month check-in with our NP's.      Ravi Tobin MD, FACS, [facility]  Bariatric and General Surgeon  [facility]

## 2024-09-16 DIAGNOSIS — E11.42 TYPE 2 DIABETES MELLITUS WITH DIABETIC POLYNEUROPATHY, WITHOUT LONG-TERM CURRENT USE OF INSULIN (HCC): ICD-10-CM

## 2024-10-08 ENCOUNTER — TELEPHONE (OUTPATIENT)
Age: 49
End: 2024-10-08

## 2024-10-08 SDOH — ECONOMIC STABILITY: FOOD INSECURITY: WITHIN THE PAST 12 MONTHS, THE FOOD YOU BOUGHT JUST DIDN'T LAST AND YOU DIDN'T HAVE MONEY TO GET MORE.: NEVER TRUE

## 2024-10-08 SDOH — ECONOMIC STABILITY: FOOD INSECURITY: WITHIN THE PAST 12 MONTHS, YOU WORRIED THAT YOUR FOOD WOULD RUN OUT BEFORE YOU GOT MONEY TO BUY MORE.: NEVER TRUE

## 2024-10-08 SDOH — ECONOMIC STABILITY: INCOME INSECURITY: HOW HARD IS IT FOR YOU TO PAY FOR THE VERY BASICS LIKE FOOD, HOUSING, MEDICAL CARE, AND HEATING?: NOT VERY HARD

## 2024-10-08 SDOH — ECONOMIC STABILITY: TRANSPORTATION INSECURITY
IN THE PAST 12 MONTHS, HAS LACK OF TRANSPORTATION KEPT YOU FROM MEETINGS, WORK, OR FROM GETTING THINGS NEEDED FOR DAILY LIVING?: NO

## 2024-10-08 NOTE — TELEPHONE ENCOUNTER
Left vm and sent [employer] message regarding patient's appointment for tomorrow. Patient is requesting referral for physical therapy as well as a \"check up.\" Informed patient that if he is requesting a complete physical at tomorrow's appointment (last physical 09/11/23), then this appointment will need to be rescheduled for either an earlier or later time tomorrow. Left number for patient to return call.

## 2024-10-09 ENCOUNTER — OFFICE VISIT (OUTPATIENT)
Age: 49
End: 2024-10-09
Payer: MEDICAID

## 2024-10-09 VITALS
TEMPERATURE: 97.9 F | SYSTOLIC BLOOD PRESSURE: 155 MMHG | WEIGHT: 250 LBS | HEART RATE: 75 BPM | OXYGEN SATURATION: 96 % | RESPIRATION RATE: 18 BRPM | BODY MASS INDEX: 33.13 KG/M2 | DIASTOLIC BLOOD PRESSURE: 99 MMHG | HEIGHT: 73 IN

## 2024-10-09 DIAGNOSIS — E11.42 TYPE 2 DIABETES MELLITUS WITH DIABETIC POLYNEUROPATHY, WITHOUT LONG-TERM CURRENT USE OF INSULIN (HCC): ICD-10-CM

## 2024-10-09 DIAGNOSIS — G89.29 CHRONIC BILATERAL LOW BACK PAIN WITH BILATERAL SCIATICA: ICD-10-CM

## 2024-10-09 DIAGNOSIS — M54.42 CHRONIC BILATERAL LOW BACK PAIN WITH BILATERAL SCIATICA: ICD-10-CM

## 2024-10-09 DIAGNOSIS — I10 ESSENTIAL HYPERTENSION: ICD-10-CM

## 2024-10-09 DIAGNOSIS — M54.41 CHRONIC BILATERAL LOW BACK PAIN WITH BILATERAL SCIATICA: ICD-10-CM

## 2024-10-09 DIAGNOSIS — E66.9 OBESITY (BMI 30-39.9): ICD-10-CM

## 2024-10-09 DIAGNOSIS — Z98.84 BARIATRIC SURGERY STATUS: ICD-10-CM

## 2024-10-09 DIAGNOSIS — G35 MULTIPLE SCLEROSIS (HCC): Primary | ICD-10-CM

## 2024-10-09 DIAGNOSIS — M79.2 NEURALGIA: ICD-10-CM

## 2024-10-09 DIAGNOSIS — G35 MULTIPLE SCLEROSIS (HCC): ICD-10-CM

## 2024-10-09 PROCEDURE — 3051F HG A1C>EQUAL 7.0%<8.0%: CPT | Performed by: PHYSICIAN ASSISTANT

## 2024-10-09 PROCEDURE — 3077F SYST BP >= 140 MM HG: CPT | Performed by: PHYSICIAN ASSISTANT

## 2024-10-09 PROCEDURE — 99214 OFFICE O/P EST MOD 30 MIN: CPT | Performed by: PHYSICIAN ASSISTANT

## 2024-10-09 PROCEDURE — G2211 COMPLEX E/M VISIT ADD ON: HCPCS | Performed by: PHYSICIAN ASSISTANT

## 2024-10-09 PROCEDURE — 3080F DIAST BP >= 90 MM HG: CPT | Performed by: PHYSICIAN ASSISTANT

## 2024-10-09 RX ORDER — CYCLOBENZAPRINE HCL 10 MG
10 TABLET ORAL NIGHTLY PRN
Qty: 30 TABLET | Refills: 0 | Status: SHIPPED | OUTPATIENT
Start: 2024-10-09

## 2024-10-09 RX ORDER — HYDROCHLOROTHIAZIDE 25 MG/1
25 TABLET ORAL EVERY MORNING
Qty: 30 TABLET | Refills: 5 | Status: SHIPPED | OUTPATIENT
Start: 2024-10-09

## 2024-10-09 ASSESSMENT — ENCOUNTER SYMPTOMS
SHORTNESS OF BREATH: 0
BACK PAIN: 1
COUGH: 0

## 2024-10-09 NOTE — PROGRESS NOTES
I have reviewed all needed documentation in preparation for visit. Verified patient by name and date of birth  Chief Complaint   Patient presents with    Check-Up     Needs a referral for physical therapy- pain,stiffness, back pain-        Vitals:    10/09/24 0830   BP: (!) 155/99   Site: Right Upper Arm   Position: Sitting   Cuff Size: Medium Adult   Pulse: 75   Resp: 18   Temp: 97.9 °F (36.6 °C)   TempSrc: Temporal   SpO2: 96%   Weight: 113.4 kg (250 lb)   Height: 1.854 m (6' 1\")       Health Maintenance Due   Topic Date Due    Hepatitis B vaccine (1 of 3 - 19+ 3-dose series) Never done    DTaP/Tdap/Td vaccine (1 - Tdap) Never done    Diabetic retinal exam  02/13/2015    Diabetic foot exam  09/03/2022    Pneumococcal 0-64 years Vaccine (2 of 2 - PCV) 09/17/2022    COVID-19 Vaccine (2 - 2023-24 season) 09/01/2024    Diabetic Alb to Cr ratio (uACR) test  09/16/2024     \"Have you been to the ER, urgent care clinic since your last visit?  Hospitalized since your last visit?\"    NO    “Have you seen or consulted any other health care providers outside of [facility] since your last visit?”    NO          Click Here for Release of Records Request         Ivonne xiao CMA

## 2024-10-09 NOTE — PROGRESS NOTES
Miguel Carroll is a 49 y.o. male  Chief Complaint   Patient presents with    Check-Up     Needs a referral for physical therapy- pain,stiffness, back pain-      Vitals:    10/09/24 0830   BP: (!) 155/99   Pulse: 75   Resp: 18   Temp: 97.9 °F (36.6 °C)   SpO2: 96%      Wt Readings from Last 3 Encounters:   10/09/24 113.4 kg (250 lb)   08/09/24 122.5 kg (270 lb)   07/25/24 126.1 kg (278 lb)     BMI Readings from Last 3 Encounters:   10/09/24 32.98 kg/m²   08/09/24 35.62 kg/m²   07/25/24 36.68 kg/m²     Health Maintenance Due   Topic Date Due    Hepatitis B vaccine (1 of 3 - 19+ 3-dose series) Never done    DTaP/Tdap/Td vaccine (1 - Tdap) Never done    Diabetic retinal exam  02/13/2015    Diabetic foot exam  09/03/2022    Pneumococcal 0-64 years Vaccine (2 of 2 - PCV) 09/17/2022    COVID-19 Vaccine (2 - 2023-24 season) 09/01/2024    Diabetic Alb to Cr ratio (uACR) test  09/16/2024     HPI  CV follow up  BP uncontrolled: BP at home 130-140s/90s consistently even with meds.   BP Readings from Last 3 Encounters:   10/09/24 (!) 155/99   07/11/24 120/73   06/29/24 (!) 159/95     Currently taking:   Hypertension Medications       Calcium Channel Blockers       amLODIPine (NORVASC) 10 MG tablet Take 1 tablet by mouth daily       Thiazides and Thiazide-Like Diuretics       hydroCHLOROthiazide (HYDRODIURIL) 25 MG tablet Take 1 tablet by mouth every morning       Angiotensin II Receptor Antagonists       olmesartan (BENICAR) 40 MG tablet Take 1 tablet by mouth daily.            Creatinine (MG/DL)   Date Value   06/29/2024 0.91      Cholesterol Meds  atorvastatin - 40 MG  Lab Results   Component Value Date     (H) 12/04/2023     LDL uncontrolled at last check.     \"MS pain\" in legs is worse \"burning pain radiating from legs B up to back\" Has MRI order lumbar spine in system from Neuro but hasn't yet done it. Willing to schedule this.   Taking Gabapentin at max dose regularly.   Tried PT exercises with you tube videos

## 2024-10-11 LAB
25(OH)D3+25(OH)D2 SERPL-MCNC: 36 NG/ML (ref 30–100)
ALBUMIN SERPL-MCNC: 4.1 G/DL (ref 4.1–5.1)
ALP SERPL-CCNC: 100 IU/L (ref 44–121)
ALT SERPL-CCNC: 18 IU/L (ref 0–44)
AST SERPL-CCNC: 15 IU/L (ref 0–40)
BASOPHILS # BLD AUTO: 0 X10E3/UL (ref 0–0.2)
BASOPHILS NFR BLD AUTO: 1 %
BILIRUB SERPL-MCNC: 0.5 MG/DL (ref 0–1.2)
BUN SERPL-MCNC: 8 MG/DL (ref 6–24)
BUN/CREAT SERPL: 10 (ref 9–20)
CALCIUM SERPL-MCNC: 9.3 MG/DL (ref 8.7–10.2)
CERULOPLASMIN SERPL-MCNC: 31.4 MG/DL (ref 16–31)
CHLORIDE SERPL-SCNC: 103 MMOL/L (ref 96–106)
CHOLEST SERPL-MCNC: 235 MG/DL (ref 100–199)
CO2 SERPL-SCNC: 23 MMOL/L (ref 20–29)
CREAT SERPL-MCNC: 0.84 MG/DL (ref 0.76–1.27)
EGFRCR SERPLBLD CKD-EPI 2021: 107 ML/MIN/1.73
EOSINOPHIL # BLD AUTO: 0.1 X10E3/UL (ref 0–0.4)
EOSINOPHIL NFR BLD AUTO: 2 %
ERYTHROCYTE [DISTWIDTH] IN BLOOD BY AUTOMATED COUNT: 14.4 % (ref 11.6–15.4)
FERRITIN SERPL-MCNC: 212 NG/ML (ref 30–400)
FOLATE SERPL-MCNC: 5 NG/ML
GLOBULIN SER CALC-MCNC: 2.9 G/DL (ref 1.5–4.5)
GLUCOSE SERPL-MCNC: 116 MG/DL (ref 70–99)
HBA1C MFR BLD: 6.1 % (ref 4.8–5.6)
HCT VFR BLD AUTO: 44 % (ref 37.5–51)
HDLC SERPL-MCNC: 50 MG/DL
HGB BLD-MCNC: 13.7 G/DL (ref 13–17.7)
IMM GRANULOCYTES # BLD AUTO: 0 X10E3/UL (ref 0–0.1)
IMM GRANULOCYTES NFR BLD AUTO: 0 %
IMP & REVIEW OF LAB RESULTS: NORMAL
IRON SATN MFR SERPL: 23 % (ref 15–55)
IRON SERPL-MCNC: 60 UG/DL (ref 38–169)
LDLC SERPL CALC-MCNC: 168 MG/DL (ref 0–99)
LYMPHOCYTES # BLD AUTO: 1.5 X10E3/UL (ref 0.7–3.1)
LYMPHOCYTES NFR BLD AUTO: 32 %
Lab: NORMAL
MAGNESIUM SERPL-MCNC: 1.8 MG/DL (ref 1.6–2.3)
MCH RBC QN AUTO: 25.6 PG (ref 26.6–33)
MCHC RBC AUTO-ENTMCNC: 31.1 G/DL (ref 31.5–35.7)
MCV RBC AUTO: 82 FL (ref 79–97)
MONOCYTES # BLD AUTO: 0.3 X10E3/UL (ref 0.1–0.9)
MONOCYTES NFR BLD AUTO: 6 %
NEUTROPHILS # BLD AUTO: 2.8 X10E3/UL (ref 1.4–7)
NEUTROPHILS NFR BLD AUTO: 59 %
PLATELET # BLD AUTO: 271 X10E3/UL (ref 150–450)
POTASSIUM SERPL-SCNC: 3.5 MMOL/L (ref 3.5–5.2)
PROT SERPL-MCNC: 7 G/DL (ref 6–8.5)
RBC # BLD AUTO: 5.36 X10E6/UL (ref 4.14–5.8)
SODIUM SERPL-SCNC: 144 MMOL/L (ref 134–144)
SPECIMEN STATUS REPORT: NORMAL
TIBC SERPL-MCNC: 261 UG/DL (ref 250–450)
TRIGL SERPL-MCNC: 98 MG/DL (ref 0–149)
TSH SERPL DL<=0.005 MIU/L-ACNC: 1.59 UIU/ML (ref 0.45–4.5)
UIBC SERPL-MCNC: 201 UG/DL (ref 111–343)
URATE SERPL-MCNC: 6.3 MG/DL (ref 3.8–8.4)
VIT B12 SERPL-MCNC: 450 PG/ML (ref 232–1245)
VLDLC SERPL CALC-MCNC: 17 MG/DL (ref 5–40)
WBC # BLD AUTO: 4.8 X10E3/UL (ref 3.4–10.8)

## 2024-10-12 LAB — ZINC SERPL-MCNC: 82 UG/DL (ref 44–115)

## 2024-10-14 LAB
SELENIUM SERPL-MCNC: 107 UG/L (ref 93–198)
VIT A SERPL-MCNC: 26.4 UG/DL (ref 20.1–62)
VIT B1 BLD-SCNC: 85.2 NMOL/L (ref 66.5–200)

## 2024-10-17 LAB — PYRIDOXAL PHOS SERPL-MCNC: 10 UG/L (ref 3.4–65.2)

## 2024-10-21 ENCOUNTER — PATIENT MESSAGE (OUTPATIENT)
Age: 49
End: 2024-10-21

## 2024-10-21 DIAGNOSIS — E78.00 PURE HYPERCHOLESTEROLEMIA, UNSPECIFIED: Primary | ICD-10-CM

## 2024-10-21 DIAGNOSIS — E11.42 TYPE 2 DIABETES MELLITUS WITH DIABETIC POLYNEUROPATHY, WITHOUT LONG-TERM CURRENT USE OF INSULIN (HCC): ICD-10-CM

## 2024-10-22 RX ORDER — ROSUVASTATIN CALCIUM 40 MG/1
40 TABLET, COATED ORAL DAILY
Qty: 90 TABLET | Refills: 3 | Status: SHIPPED | OUTPATIENT
Start: 2024-10-22

## 2024-10-23 ENCOUNTER — HOSPITAL ENCOUNTER (OUTPATIENT)
Facility: HOSPITAL | Age: 49
Discharge: HOME | End: 2024-10-26
Attending: PSYCHIATRY & NEUROLOGY
Payer: MEDICAID

## 2024-10-23 DIAGNOSIS — M54.41 CHRONIC RIGHT-SIDED LOW BACK PAIN WITH RIGHT-SIDED SCIATICA: ICD-10-CM

## 2024-10-23 DIAGNOSIS — G89.29 CHRONIC RIGHT-SIDED LOW BACK PAIN WITH RIGHT-SIDED SCIATICA: ICD-10-CM

## 2024-10-23 PROCEDURE — 72148 MRI LUMBAR SPINE W/O DYE: CPT

## 2024-10-28 ENCOUNTER — HOSPITAL ENCOUNTER (OUTPATIENT)
Facility: HOSPITAL | Age: 49
Setting detail: RECURRING SERIES
Discharge: HOME | End: 2024-10-31
Payer: MEDICAID

## 2024-10-28 DIAGNOSIS — G89.29 CHRONIC BILATERAL LOW BACK PAIN WITH BILATERAL SCIATICA: ICD-10-CM

## 2024-10-28 DIAGNOSIS — M54.41 CHRONIC BILATERAL LOW BACK PAIN WITH BILATERAL SCIATICA: ICD-10-CM

## 2024-10-28 DIAGNOSIS — M54.42 CHRONIC BILATERAL LOW BACK PAIN WITH BILATERAL SCIATICA: ICD-10-CM

## 2024-10-28 PROCEDURE — 97110 THERAPEUTIC EXERCISES: CPT

## 2024-10-28 PROCEDURE — 97162 PT EVAL MOD COMPLEX 30 MIN: CPT

## 2024-10-28 PROCEDURE — 97535 SELF CARE MNGMENT TRAINING: CPT

## 2024-10-28 PROCEDURE — 97530 THERAPEUTIC ACTIVITIES: CPT

## 2024-10-28 RX ORDER — CYCLOBENZAPRINE HCL 10 MG
10 TABLET ORAL NIGHTLY PRN
Qty: 30 TABLET | Refills: 0 | Status: SHIPPED | OUTPATIENT
Start: 2024-10-28

## 2024-10-28 NOTE — THERAPY EVALUATION
Physical Therapy at [place],   a part of [place] [address] [facility], [address]  [place], [place] [address]  Phone: [phone]  Fax: [phone]       PHYSICAL THERAPY - EVALUATION/PLAN OF CARE NOTE (updated 3/23)      Date: 10/28/2024          Patient Name:  Miguel Carroll :  1975   Medical   Diagnosis:  Multiple sclerosis (HCC) [G35]  Chronic bilateral low back pain with bilateral sciatica [M54.42, M54.41, G89.29] Treatment Diagnosis:  M54.59, G89.29  CHRONIC LOWER BACK PAIN  and M62.81  GENERAL MUSCLE WEAKNESS and R26.89   Abnormalities of gait and mobility    Referral Source:  Rosa Flores PA-C Provider #:  [id number]                Insurance: Payor: [facility] MEDICAID / Plan: [facility] HEALTHKEEPERS PLUS / Product Type: *No Product type* /      Patient  verified yes     Visit #   Current  / Total 1 24   Time   In / Out 1040a 1150a   Total Treatment Time 70   Total Timed Codes 40         SUBJECTIVE  Pain Level (0-10 scale): 7  []constant [x]intermittent []improving []worsening []no change since onset    Any medication changes, allergies to medications, adverse drug reactions, diagnosis change, or new procedure performed?: [x] No    [] Yes (see summary sheet for update)  Medications: Verified on Patient Summary List    Subjective functional status/changes:     Pt reports he was diagnosed with relapsing remitting MS in .  He gets \"flare ups\" regularly without much relief from different trials of medications over the years.  He notes regular steroid IV has helped temporarily.  He recently had weight loss surgery and that helped a lot as he can feel his legs better and it is much easier to get around.  He reports right leg is stronger than the left, which he has to drag when he gets tired.  He reports the back pain started 2 years ago and is worse when he has been sitting still too long.  When he goes to stand up it is a process and

## 2024-10-31 ENCOUNTER — HOSPITAL ENCOUNTER (OUTPATIENT)
Facility: HOSPITAL | Age: 49
Setting detail: RECURRING SERIES
Discharge: HOME | End: 2024-11-03
Payer: MEDICAID

## 2024-10-31 PROCEDURE — 97535 SELF CARE MNGMENT TRAINING: CPT

## 2024-10-31 PROCEDURE — 97112 NEUROMUSCULAR REEDUCATION: CPT

## 2024-10-31 PROCEDURE — 97110 THERAPEUTIC EXERCISES: CPT

## 2024-10-31 NOTE — PROGRESS NOTES
PHYSICAL THERAPY - DAILY TREATMENT NOTE (updated 3/23)      Date: 10/31/2024          Patient Name:  Miguel Carroll :  1975   Medical   Diagnosis:  Multiple sclerosis (HCC) [G35]  Chronic bilateral low back pain with bilateral sciatica [M54.42, M54.41, G89.29] Treatment Diagnosis:  M54.59, G89.29  CHRONIC LOWER BACK PAIN  and M62.81  GENERAL MUSCLE WEAKNESS and R26.89   Abnormalities of gait and mobility    Referral Source:  Rosa Flores PA-C Insurance:   Payor: [facility] MEDICAID / Plan: [facility] HEALTHKEEPERS PLUS / Product Type: *No Product type* /                     Patient  verified yes     Visit #   Current  / Total 2 24   Time   In / Out 8:00 a 9:00 a   Total Treatment Time 60   Total Timed Codes 60         SUBJECTIVE    Pain Level (0-10 scale): 7    Any medication changes, allergies to medications, adverse drug reactions, diagnosis change, or new procedure performed?: [x] No    [] Yes (see summary sheet for update)  Medications: Verified on Patient Summary List    Subjective functional status/changes:     Patient reports that today is about an average pain and stiffness day.  He tried his HEP and fell once but has figured out that he needs to stand with a chair to assist his balance.  He did not injure anything in the fall.      OBJECTIVE      Therapeutic Procedures:  Tx Min Billable or 1:1 Min (if diff from Tx Min) Procedure, Rationale, Specifics   35  40913 Therapeutic Exercise (timed):  increase ROM, strength, coordination, balance, and proprioception to improve patient's ability to progress to PLOF and address remaining functional goals. (see flow sheet as applicable)     Details if applicable:     15  19468 Neuromuscular Re-Education (timed):  improve balance, coordination, kinesthetic sense, posture, core stability and proprioception to improve patient's ability to develop conscious control of individual muscles and awareness of position of extremities in order to

## 2024-11-05 ENCOUNTER — HOSPITAL ENCOUNTER (OUTPATIENT)
Facility: HOSPITAL | Age: 49
Setting detail: RECURRING SERIES
Discharge: HOME | End: 2024-11-08
Payer: MEDICAID

## 2024-11-05 PROCEDURE — 97110 THERAPEUTIC EXERCISES: CPT

## 2024-11-05 PROCEDURE — 97112 NEUROMUSCULAR REEDUCATION: CPT

## 2024-11-05 NOTE — PROGRESS NOTES
PHYSICAL THERAPY - DAILY TREATMENT NOTE (updated 3/23)      Date: 2024          Patient Name:  Miguel Carroll :  1975   Medical   Diagnosis:  Multiple sclerosis (HCC) [G35]  Chronic bilateral low back pain with bilateral sciatica [M54.42, M54.41, G89.29] Treatment Diagnosis:  M54.59, G89.29  CHRONIC LOWER BACK PAIN  and M62.81  GENERAL MUSCLE WEAKNESS and R26.89   Abnormalities of gait and mobility    Referral Source:  Rosa Flores PA-C Insurance:   Payor: [facility] MEDICAID / Plan: [facility] HEALTHKEEPERS PLUS / Product Type: *No Product type* /                     Patient  verified yes     Visit #   Current  / Total 3 24   Time   In / Out 1225p 120p   Total Treatment Time 55   Total Timed Codes 55         SUBJECTIVE    Pain Level (0-10 scale): 7    Any medication changes, allergies to medications, adverse drug reactions, diagnosis change, or new procedure performed?: [x] No    [] Yes (see summary sheet for update)  Medications: Verified on Patient Summary List    Subjective functional status/changes:     Patient reports he got an electric wheelchair 2 years ago.  He sees his primary care physician in January.  He notes stiffness and pain radiating into L greater than R posterior hip.  He has been doing stretches at home and they help temporarily, but he is more aware of his leaning forward.    OBJECTIVE      Therapeutic Procedures:  Tx Min Billable or 1:1 Min (if diff from Tx Min) Procedure, Rationale, Specifics   30  68841 Therapeutic Exercise (timed):  increase ROM, strength, coordination, balance, and proprioception to improve patient's ability to progress to PLOF and address remaining functional goals. (see flow sheet as applicable)     Details if applicable:     25  46719 Neuromuscular Re-Education (timed):  improve balance, coordination, kinesthetic sense, posture, core stability and proprioception to improve patient's ability to develop conscious control of individual

## 2024-11-07 ENCOUNTER — HOSPITAL ENCOUNTER (OUTPATIENT)
Facility: HOSPITAL | Age: 49
Setting detail: RECURRING SERIES
Discharge: HOME | End: 2024-11-10
Payer: MEDICAID

## 2024-11-07 PROCEDURE — 97112 NEUROMUSCULAR REEDUCATION: CPT

## 2024-11-07 PROCEDURE — 97110 THERAPEUTIC EXERCISES: CPT

## 2024-11-11 ENCOUNTER — HOSPITAL ENCOUNTER (OUTPATIENT)
Facility: HOSPITAL | Age: 49
Setting detail: RECURRING SERIES
Discharge: HOME | End: 2024-11-14
Payer: MEDICAID

## 2024-11-11 PROCEDURE — 97112 NEUROMUSCULAR REEDUCATION: CPT

## 2024-11-11 PROCEDURE — 97110 THERAPEUTIC EXERCISES: CPT

## 2024-11-11 NOTE — PROGRESS NOTES
PHYSICAL THERAPY - DAILY TREATMENT NOTE (updated 3/23)      Date: 2024          Patient Name:  Miguel Carroll :  1975   Medical   Diagnosis:  Multiple sclerosis (HCC) [G35]  Chronic bilateral low back pain with bilateral sciatica [M54.42, M54.41, G89.29] Treatment Diagnosis:  M54.59, G89.29  CHRONIC LOWER BACK PAIN  and M62.81  GENERAL MUSCLE WEAKNESS and R26.89   Abnormalities of gait and mobility    Referral Source:  Rosa Flores PA-C Insurance:   Payor: [facility] MEDICAID / Plan: [facility] HEALTHKEEPERS PLUS / Product Type: *No Product type* /                     Patient  verified yes     Visit #   Current  / Total 5 24   Time   In / Out 1220p 115p   Total Treatment Time 55   Total Timed Codes 55         SUBJECTIVE    Pain Level (0-10 scale): 6    Any medication changes, allergies to medications, adverse drug reactions, diagnosis change, or new procedure performed?: [x] No    [] Yes (see summary sheet for update)  Medications: Verified on Patient Summary List    Subjective functional status/changes:       Patient reports he saw his PCP and they discussed his need for a taller walker.  She plans on ordering a new one for him that he can potentially  this week.  He reports the back spasms happen less often, but they still can last up to 3 minutes when he gets up or sits down.    OBJECTIVE      Therapeutic Procedures:  Tx Min Billable or 1:1 Min (if diff from Tx Min) Procedure, Rationale, Specifics   30  38006 Therapeutic Exercise (timed):  increase ROM, strength, coordination, balance, and proprioception to improve patient's ability to progress to PLOF and address remaining functional goals. (see flow sheet as applicable)     Details if applicable:     25  72338 Neuromuscular Re-Education (timed):  improve balance, coordination, kinesthetic sense, posture, core stability and proprioception to improve patient's ability to develop conscious control of individual muscles

## 2024-11-13 ENCOUNTER — APPOINTMENT (OUTPATIENT)
Facility: HOSPITAL | Age: 49
End: 2024-11-13
Payer: MEDICAID

## 2024-11-14 ENCOUNTER — TELEPHONE (OUTPATIENT)
Age: 49
End: 2024-11-14

## 2024-11-14 ENCOUNTER — OFFICE VISIT (OUTPATIENT)
Age: 49
End: 2024-11-14
Payer: MEDICAID

## 2024-11-14 VITALS
WEIGHT: 247 LBS | DIASTOLIC BLOOD PRESSURE: 82 MMHG | RESPIRATION RATE: 18 BRPM | TEMPERATURE: 97.4 F | BODY MASS INDEX: 32.74 KG/M2 | OXYGEN SATURATION: 100 % | HEART RATE: 76 BPM | HEIGHT: 73 IN | SYSTOLIC BLOOD PRESSURE: 138 MMHG

## 2024-11-14 DIAGNOSIS — G35 MULTIPLE SCLEROSIS (HCC): ICD-10-CM

## 2024-11-14 DIAGNOSIS — E11.42 TYPE 2 DIABETES MELLITUS WITH DIABETIC POLYNEUROPATHY, WITHOUT LONG-TERM CURRENT USE OF INSULIN (HCC): Primary | ICD-10-CM

## 2024-11-14 DIAGNOSIS — F33.1 MODERATE EPISODE OF RECURRENT MAJOR DEPRESSIVE DISORDER (HCC): ICD-10-CM

## 2024-11-14 DIAGNOSIS — I10 ESSENTIAL HYPERTENSION: ICD-10-CM

## 2024-11-14 PROCEDURE — 99214 OFFICE O/P EST MOD 30 MIN: CPT | Performed by: PHYSICIAN ASSISTANT

## 2024-11-14 RX ORDER — OLMESARTAN MEDOXOMIL 40 MG/1
40 TABLET ORAL DAILY
Qty: 90 TABLET | Refills: 1 | Status: SHIPPED | OUTPATIENT
Start: 2024-11-14

## 2024-11-14 RX ORDER — DALFAMPRIDINE 10 MG/1
10 TABLET, FILM COATED, EXTENDED RELEASE ORAL EVERY 12 HOURS
Qty: 180 TABLET | Refills: 1 | Status: ACTIVE | OUTPATIENT
Start: 2024-11-14

## 2024-11-14 RX ORDER — HYDROCHLOROTHIAZIDE 25 MG/1
25 TABLET ORAL EVERY MORNING
Qty: 90 TABLET | Refills: 1 | Status: SHIPPED | OUTPATIENT
Start: 2024-11-14

## 2024-11-14 RX ORDER — AMLODIPINE BESYLATE 10 MG/1
10 TABLET ORAL DAILY
Qty: 90 TABLET | Refills: 1 | Status: SHIPPED | OUTPATIENT
Start: 2024-11-14

## 2024-11-14 RX ORDER — GABAPENTIN 600 MG/1
1200 TABLET ORAL 3 TIMES DAILY
Qty: 540 TABLET | Refills: 1 | Status: SHIPPED | OUTPATIENT
Start: 2024-11-14 | End: 2025-05-13

## 2024-11-14 ASSESSMENT — ENCOUNTER SYMPTOMS
COUGH: 0
SHORTNESS OF BREATH: 0

## 2024-11-14 NOTE — PROGRESS NOTES
Miguel Carroll is a 49 y.o. male  Chief Complaint   Patient presents with    Follow-up     5 weeks F/U-  no concerns      Vitals:    11/14/24 0931   BP: 138/82   Pulse:    Resp:    Temp:    SpO2:       Wt Readings from Last 3 Encounters:   11/14/24 112 kg (247 lb)   10/09/24 113.4 kg (250 lb)   08/09/24 122.5 kg (270 lb)     BMI Readings from Last 3 Encounters:   11/14/24 32.59 kg/m²   10/09/24 32.98 kg/m²   08/09/24 35.62 kg/m²     Health Maintenance Due   Topic Date Due    Hepatitis B vaccine (1 of 3 - 19+ 3-dose series) Never done    DTaP/Tdap/Td vaccine (1 - Tdap) Never done    Diabetic retinal exam  02/13/2015    Diabetic foot exam  09/03/2022    Pneumococcal 0-64 years Vaccine (2 of 2 - PCV) 09/17/2022    COVID-19 Vaccine (2 - 2023-24 season) 09/01/2024    Diabetic Alb to Cr ratio (uACR) test  09/16/2024     HPI  CV follow up  BP now controlled!: Added HCTZ at last visit 10/9. Feeling well  BP Readings from Last 3 Encounters:   11/14/24 138/82   10/09/24 (!) 155/99   07/11/24 120/73     Currently taking:   Hypertension Medications       Calcium Channel Blockers       amLODIPine (NORVASC) 10 MG tablet Take 1 tablet by mouth daily       Thiazides and Thiazide-Like Diuretics       hydroCHLOROthiazide (HYDRODIURIL) 25 MG tablet Take 1 tablet by mouth every morning       Angiotensin II Receptor Antagonists       olmesartan (BENICAR) 40 MG tablet Take 1 tablet by mouth daily.            Creatinine (mg/dL)   Date Value   10/10/2024 0.84      Cholesterol Meds  rosuvastatin - 40 MG  Lab Results   Component Value Date     (H) 10/10/2024     LDL uncontrolled at last check, so we changed to Rosuvastatin 40 mg last month. Feeling well with this change    MS followed by Neurology. On max dose of Gabapentin still having pain, but improved over baseline.     Depression is controlled.     ROS  Review of Systems   Constitutional:  Negative for fever.   Respiratory:  Negative for cough and shortness of breath.

## 2024-11-14 NOTE — TELEPHONE ENCOUNTER
HIPAA Verified (if caller is someone other than patient): N/A       Reason for Call:     Medication Name:   dalfampridine (AMPYRA) 10 MG     Pharmacy (if different from pharmacy on file):   [facility]    Prescribing Provider:   Lina    Last Office Visit (must be within last 12 months - if not, schedule appointment then send refill encounter):   05/10/24     Is patient completely out of medication?   yes        Message: (any additional details from patient/caller not covered above)        Level 1 Calls - attempted to reach practice? N/A     Reason Call Marked High Priority (if applicable):

## 2024-11-14 NOTE — PROGRESS NOTES
I have reviewed all needed documentation in preparation for visit. Verified patient by name and date of birth  Chief Complaint   Patient presents with    Follow-up     5 weeks F/U-  no concerns        Vitals:    11/14/24 0920   BP: (!) 146/86   Site: Right Upper Arm   Position: Sitting   Cuff Size: Medium Adult   Pulse: 76   Resp: 18   Temp: 97.4 °F (36.3 °C)   TempSrc: Temporal   SpO2: 100%   Weight: 112 kg (247 lb)   Height: 1.854 m (6' 1\")       Health Maintenance Due   Topic Date Due    Hepatitis B vaccine (1 of 3 - 19+ 3-dose series) Never done    DTaP/Tdap/Td vaccine (1 - Tdap) Never done    Diabetic retinal exam  02/13/2015    Diabetic foot exam  09/03/2022    Pneumococcal 0-64 years Vaccine (2 of 2 - PCV) 09/17/2022    COVID-19 Vaccine (2 - 2023-24 season) 09/01/2024    Diabetic Alb to Cr ratio (uACR) test  09/16/2024     \"Have you been to the ER, urgent care clinic since your last visit?  Hospitalized since your last visit?\"    NO    “Have you seen or consulted any other health care providers outside of [facility] since your last visit?”    NO          Click Here for Release of Records Request         Ivonne xiao CMA

## 2024-11-15 LAB
ALBUMIN/CREAT UR: 6 MG/G CREAT (ref 0–29)
CREAT UR-MCNC: 260.8 MG/DL
MICROALBUMIN UR-MCNC: 14.7 UG/ML

## 2024-11-18 ENCOUNTER — HOSPITAL ENCOUNTER (OUTPATIENT)
Facility: HOSPITAL | Age: 49
Setting detail: RECURRING SERIES
Discharge: HOME | End: 2024-11-21
Payer: MEDICAID

## 2024-11-18 PROCEDURE — 97112 NEUROMUSCULAR REEDUCATION: CPT

## 2024-11-18 PROCEDURE — 97110 THERAPEUTIC EXERCISES: CPT

## 2024-11-18 NOTE — PROGRESS NOTES
PHYSICAL THERAPY - DAILY TREATMENT NOTE (updated 3/23)      Date: 2024          Patient Name:  iMguel Carroll :  1975   Medical   Diagnosis:  Multiple sclerosis (HCC) [G35]  Chronic bilateral low back pain with bilateral sciatica [M54.42, M54.41, G89.29] Treatment Diagnosis:  M54.59, G89.29  CHRONIC LOWER BACK PAIN  and M62.81  GENERAL MUSCLE WEAKNESS and R26.89   Abnormalities of gait and mobility    Referral Source:  Rosa Flores PA-C Insurance:   Payor: [facility] MEDICAID / Plan: [facility] HEALTHKEEPERS PLUS / Product Type: *No Product type* /                     Patient  verified yes     Visit #   Current  / Total 6 24   Time   In / Out 1145a 1230p   Total Treatment Time 45   Total Timed Codes 45         SUBJECTIVE    Pain Level (0-10 scale): 3    Any medication changes, allergies to medications, adverse drug reactions, diagnosis change, or new procedure performed?: [x] No    [] Yes (see summary sheet for update)  Medications: Verified on Patient Summary List    Subjective functional status/changes:       Patient reports he goes to pickup new rollator after today's visit.  He was using the wall this weekend and the mirror to work on standing up tall.  The back is feeling pretty good today, he feels standing taller is working.    OBJECTIVE      Therapeutic Procedures:  Tx Min Billable or 1:1 Min (if diff from Tx Min) Procedure, Rationale, Specifics   30  27731 Therapeutic Exercise (timed):  increase ROM, strength, coordination, balance, and proprioception to improve patient's ability to progress to PLOF and address remaining functional goals. (see flow sheet as applicable)     Details if applicable:     15  76073 Neuromuscular Re-Education (timed):  improve balance, coordination, kinesthetic sense, posture, core stability and proprioception to improve patient's ability to develop conscious control of individual muscles and awareness of position of extremities in order to

## 2024-11-20 ENCOUNTER — APPOINTMENT (OUTPATIENT)
Facility: HOSPITAL | Age: 49
End: 2024-11-20
Payer: MEDICAID

## 2024-12-02 ENCOUNTER — HOSPITAL ENCOUNTER (OUTPATIENT)
Facility: HOSPITAL | Age: 49
Setting detail: RECURRING SERIES
Discharge: HOME | End: 2024-12-05
Payer: MEDICAID

## 2024-12-02 PROCEDURE — 97112 NEUROMUSCULAR REEDUCATION: CPT

## 2024-12-02 PROCEDURE — 97110 THERAPEUTIC EXERCISES: CPT

## 2024-12-02 NOTE — PROGRESS NOTES
PHYSICAL THERAPY - DAILY TREATMENT NOTE (updated 3/23)      Date: 2024          Patient Name:  Miguel Carroll :  1975   Medical   Diagnosis:  Multiple sclerosis (HCC) [G35]  Chronic bilateral low back pain with bilateral sciatica [M54.42, M54.41, G89.29] Treatment Diagnosis:  M54.59, G89.29  CHRONIC LOWER BACK PAIN  and M62.81  GENERAL MUSCLE WEAKNESS and R26.89   Abnormalities of gait and mobility    Referral Source:  Rosa Flores PA-C Insurance:   Payor: [facility] MEDICAID / Plan: [facility] HEALTHKEEPERS PLUS / Product Type: *No Product type* /                     Patient  verified yes     Visit #   Current  / Total 6 24   Time   In / Out 1005a 1045a   Total Treatment Time 40   Total Timed Codes 40         SUBJECTIVE    Pain Level (0-10 scale): 2 \"sore\"    Any medication changes, allergies to medications, adverse drug reactions, diagnosis change, or new procedure performed?: [x] No    [] Yes (see summary sheet for update)  Medications: Verified on Patient Summary List    Subjective functional status/changes:       Patient has his new upright walker and reports he feels like he can stand up better.  He notes his back is tight from attempting to stand straight up more often and do squats at home to get his legs stronger.  He notes a few spasms still with getting up and down, but they occur much less often.    OBJECTIVE      Therapeutic Procedures:  Tx Min Billable or 1:1 Min (if diff from Tx Min) Procedure, Rationale, Specifics   30  87513 Therapeutic Exercise (timed):  increase ROM, strength, coordination, balance, and proprioception to improve patient's ability to progress to PLOF and address remaining functional goals. (see flow sheet as applicable)     Details if applicable:     10  98974 Neuromuscular Re-Education (timed):  improve balance, coordination, kinesthetic sense, posture, core stability and proprioception to improve patient's ability to develop conscious control of

## 2024-12-09 ENCOUNTER — HOSPITAL ENCOUNTER (OUTPATIENT)
Facility: HOSPITAL | Age: 49
Setting detail: RECURRING SERIES
End: 2024-12-09
Payer: MEDICAID

## 2024-12-11 ENCOUNTER — HOSPITAL ENCOUNTER (OUTPATIENT)
Facility: HOSPITAL | Age: 49
Setting detail: RECURRING SERIES
Discharge: HOME | End: 2024-12-14
Payer: MEDICAID

## 2024-12-11 PROCEDURE — 97110 THERAPEUTIC EXERCISES: CPT

## 2024-12-11 PROCEDURE — 97112 NEUROMUSCULAR REEDUCATION: CPT

## 2024-12-11 NOTE — PROGRESS NOTES
PHYSICAL THERAPY - DAILY TREATMENT NOTE (updated 3/23)      Date: 2024          Patient Name:  Miguel Carroll :  1975   Medical   Diagnosis:  Multiple sclerosis (HCC) [G35]  Chronic bilateral low back pain with bilateral sciatica [M54.42, M54.41, G89.29] Treatment Diagnosis:  M54.59, G89.29  CHRONIC LOWER BACK PAIN  and M62.81  GENERAL MUSCLE WEAKNESS and R26.89   Abnormalities of gait and mobility    Referral Source:  Rosa Flores PA-C Insurance:   Payor: [facility] MEDICAID / Plan: [facility] HEALTHKEEPERS PLUS / Product Type: *No Product type* /                     Patient  verified yes     Visit #   Current  / Total 8 24   Time   In / Out 7:45 a 8:40 a   Total Treatment Time 55   Total Timed Codes 55         SUBJECTIVE    Pain Level (0-10 scale): 2 \"sore\"    Any medication changes, allergies to medications, adverse drug reactions, diagnosis change, or new procedure performed?: [x] No    [] Yes (see summary sheet for update)  Medications: Verified on Patient Summary List    Subjective functional status/changes:       Patient reports that his back is feeling better but his legs are buckling more.      OBJECTIVE      Therapeutic Procedures:  Tx Min Billable or 1:1 Min (if diff from Tx Min) Procedure, Rationale, Specifics   40  49004 Therapeutic Exercise (timed):  increase ROM, strength, coordination, balance, and proprioception to improve patient's ability to progress to PLOF and address remaining functional goals. (see flow sheet as applicable)     Details if applicable:     15  24636 Neuromuscular Re-Education (timed):  improve balance, coordination, kinesthetic sense, posture, core stability and proprioception to improve patient's ability to develop conscious control of individual muscles and awareness of position of extremities in order to progress to PLOF and address remaining functional goals. (see flow sheet as applicable)     Details if applicable:  emphasis on PPT and standing

## 2024-12-11 NOTE — THERAPY RECERTIFICATION
assistance and rollator AD.    3. Pt will transfer sit to stand without pain and minimal UE assistance to improve functional mobility in the home.   4. The patient will demonstrate lumbar flexion AROM to reach ankles or greater to improve ease in reaching items off of a low shelf in home setting and dressing tasks.         Long Term Goals: To be accomplished in 16 treatments.  Patient will report worst pain no greater than 2/10 to increase QOL and allow for independence with all hygienic self-care and ADL skills   Patient will demonstrate full lumbar AROM WFL to allow for sitting for greater than 30 minutes without imposed restrictions.  Pt will demo amb x 500 ft with use of rollator and less than 3/10 pain and no LOB at the time of D/C.  4. Pt will demo independence with sit to stand transfers from a higher surface with use of UE solely for balance and report of < 2/10 pain.    Frequency / Duration:   Patient to be seen   2   times per week for   up to 16   treatments:      Assessments/Recommendations for Continuation of Care: At time of re-certification, patient demonstrates improvements in lumbar AROM multidirectionally, BLE strength, and has acquired an upright rollator which improves his posture with ambulation.  He reports overall improvement in lumbar pain although spasms can still be severe.  He continues to demonstrate L toes dragging with gait 2/2 gross LLE weakness.  He will continue to benefit from skilled PT services 2x/week for 8 weeks to achieve outstanding goals.     If you have any questions/comments please contact us directly at [phone].   Thank you for allowing us to assist in the care of your patient.    Certification Period: 12/11/24-2/8/25      Ender Henson, PT, DPT       12/11/2024       9:03 AM      ___ I have read the above report and request that my patient continue as recommended.   ___ I have read the above report and request that my patient continue therapy with the following

## 2024-12-13 ENCOUNTER — HOSPITAL ENCOUNTER (OUTPATIENT)
Facility: HOSPITAL | Age: 49
Setting detail: RECURRING SERIES
Discharge: HOME | End: 2024-12-16
Payer: MEDICAID

## 2024-12-13 PROCEDURE — 97110 THERAPEUTIC EXERCISES: CPT | Performed by: PHYSICAL THERAPIST

## 2024-12-13 PROCEDURE — 97112 NEUROMUSCULAR REEDUCATION: CPT | Performed by: PHYSICAL THERAPIST

## 2024-12-13 NOTE — PROGRESS NOTES
PHYSICAL THERAPY - DAILY TREATMENT NOTE (updated 3/23)      Date: 2024          Patient Name:  Miguel Carroll :  1975   Medical   Diagnosis:  Multiple sclerosis (HCC) [G35]  Chronic bilateral low back pain with bilateral sciatica [M54.42, M54.41, G89.29] Treatment Diagnosis:  M54.59, G89.29  CHRONIC LOWER BACK PAIN  and M62.81  GENERAL MUSCLE WEAKNESS and R26.89   Abnormalities of gait and mobility    Referral Source:  Rosa Flores PA-C Insurance:   Payor: [facility] MEDICAID / Plan: [facility] HEALTHKEEPERS PLUS / Product Type: *No Product type* /                     Patient  verified yes     Visit #   Current  / Total 9 24   Time   In / Out 8:45a 9:25a   Total Treatment Time 40   Total Timed Codes 40         SUBJECTIVE    Pain Level (0-10 scale): 2 \"sore\"    Any medication changes, allergies to medications, adverse drug reactions, diagnosis change, or new procedure performed?: [x] No    [] Yes (see summary sheet for update)  Medications: Verified on Patient Summary List    Subjective functional status/changes:       Patient reports that today is not a good day so far. He is having difficulty moving his L LE when ambulating. His back is feeling about the same and is unsure if the MH was helpful during the last session.    *patient arrived 15 min late for appt    OBJECTIVE      Therapeutic Procedures:  Tx Min Billable or 1:1 Min (if diff from Tx Min) Procedure, Rationale, Specifics   25  88194 Therapeutic Exercise (timed):  increase ROM, strength, coordination, balance, and proprioception to improve patient's ability to progress to PLOF and address remaining functional goals. (see flow sheet as applicable)     Details if applicable:     15  47312 Neuromuscular Re-Education (timed):  improve balance, coordination, kinesthetic sense, posture, core stability and proprioception to improve patient's ability to develop conscious control of individual muscles and awareness of position of

## 2024-12-16 ENCOUNTER — APPOINTMENT (OUTPATIENT)
Facility: HOSPITAL | Age: 49
End: 2024-12-16
Payer: MEDICAID

## 2025-02-27 NOTE — PROGRESS NOTES
Neurology Progress Note    Patient ID:  Miguel Carroll  [id number]  49 y.o.  1975      Subjective:   History:  Mr. Carroll with R knee surgery, diabetes, BPH, HTN, hypercholesterol, obesity previously seen by Dr Reich for MS. Completed [facility] in - . Currently not on medication for MS. Baseline has numbness of both lower extremities and some weakness of both legs needing to use a walker. For the past 1 yr, patient noted R forearm pain. Takes Gabapentin 100 mg QHS and Cymbalta 60 mg QD c/o PCP. Takes Vit D3.    Since last time, patient had weight loss surgery and lost 100 lbs so far.    Feels generally better but still having R lower back pain radiating to R leg. (+) R knee grinding    Has done physical therapy.    Now uses an upright walker.    Still on Ampyra 10 mg BID with increase speed of walking.         ROS:  Per HPI-  Otherwise the remainder of ROS was negative    Social Hx  Social History     Socioeconomic History    Marital status:    Tobacco Use    Smoking status: Former     Types: Cigarettes     Start date:      Quit date:      Years since quittin.1    Smokeless tobacco: Former     Quit date: 2009   Vaping Use    Vaping status: Never Used   Substance and Sexual Activity    Alcohol use: Not Currently    Drug use: Not Currently     Frequency: 14.0 times per week     Types: Marijuana (Weed)     Comment: stopped 4 months ago    Sexual activity: Yes     Partners: Female   Social History Narrative    Worked as a  x 12 years prior to changing career    Works in IS for [employer]      In the home with spouse and 4 children ages 21-12 (all well)     Social Determinants of Health     Financial Resource Strain: Low Risk  (10/8/2024)    Overall Financial Resource Strain (CARDIA)     Difficulty of Paying Living Expenses: Not very hard   Food Insecurity: No Food Insecurity (10/8/2024)    Hunger Vital Sign     Worried About Running Out of Food in the Last

## 2025-02-28 ENCOUNTER — OFFICE VISIT (OUTPATIENT)
Age: 50
End: 2025-02-28
Payer: MEDICAID

## 2025-02-28 VITALS
HEART RATE: 73 BPM | DIASTOLIC BLOOD PRESSURE: 70 MMHG | OXYGEN SATURATION: 97 % | BODY MASS INDEX: 32.59 KG/M2 | HEIGHT: 73 IN | SYSTOLIC BLOOD PRESSURE: 120 MMHG | TEMPERATURE: 95.9 F

## 2025-02-28 DIAGNOSIS — G35 MULTIPLE SCLEROSIS (HCC): ICD-10-CM

## 2025-02-28 DIAGNOSIS — R26.9 GAIT DISTURBANCE: ICD-10-CM

## 2025-02-28 DIAGNOSIS — M54.41 CHRONIC RIGHT-SIDED LOW BACK PAIN WITH RIGHT-SIDED SCIATICA: Primary | ICD-10-CM

## 2025-02-28 DIAGNOSIS — G89.29 CHRONIC RIGHT-SIDED LOW BACK PAIN WITH RIGHT-SIDED SCIATICA: Primary | ICD-10-CM

## 2025-02-28 PROCEDURE — 99214 OFFICE O/P EST MOD 30 MIN: CPT | Performed by: PSYCHIATRY & NEUROLOGY

## 2025-02-28 PROCEDURE — 3078F DIAST BP <80 MM HG: CPT | Performed by: PSYCHIATRY & NEUROLOGY

## 2025-02-28 PROCEDURE — 3074F SYST BP LT 130 MM HG: CPT | Performed by: PSYCHIATRY & NEUROLOGY

## 2025-03-12 ENCOUNTER — OFFICE VISIT (OUTPATIENT)
Age: 50
End: 2025-03-12
Payer: MEDICAID

## 2025-03-12 VITALS
RESPIRATION RATE: 18 BRPM | WEIGHT: 205 LBS | HEIGHT: 72 IN | TEMPERATURE: 99.4 F | OXYGEN SATURATION: 99 % | DIASTOLIC BLOOD PRESSURE: 100 MMHG | HEART RATE: 86 BPM | SYSTOLIC BLOOD PRESSURE: 160 MMHG | BODY MASS INDEX: 27.77 KG/M2

## 2025-03-12 DIAGNOSIS — G35 MULTIPLE SCLEROSIS (HCC): ICD-10-CM

## 2025-03-12 DIAGNOSIS — Z98.84 BARIATRIC SURGERY STATUS: ICD-10-CM

## 2025-03-12 DIAGNOSIS — M79.2 NEURALGIA: ICD-10-CM

## 2025-03-12 DIAGNOSIS — E11.42 TYPE 2 DIABETES MELLITUS WITH DIABETIC POLYNEUROPATHY, WITHOUT LONG-TERM CURRENT USE OF INSULIN (HCC): ICD-10-CM

## 2025-03-12 DIAGNOSIS — Z00.00 ANNUAL PHYSICAL EXAM: ICD-10-CM

## 2025-03-12 DIAGNOSIS — I10 ESSENTIAL HYPERTENSION: ICD-10-CM

## 2025-03-12 DIAGNOSIS — E66.3 OVERWEIGHT (BMI 25.0-29.9): ICD-10-CM

## 2025-03-12 DIAGNOSIS — E55.9 VITAMIN D DEFICIENCY: ICD-10-CM

## 2025-03-12 DIAGNOSIS — F33.1 MODERATE EPISODE OF RECURRENT MAJOR DEPRESSIVE DISORDER (HCC): ICD-10-CM

## 2025-03-12 DIAGNOSIS — Z00.00 ANNUAL PHYSICAL EXAM: Primary | ICD-10-CM

## 2025-03-12 PROBLEM — E66.01 MORBID (SEVERE) OBESITY DUE TO EXCESS CALORIES: Status: RESOLVED | Noted: 2019-04-10 | Resolved: 2025-03-12

## 2025-03-12 PROBLEM — E66.01 MORBID OBESITY: Status: RESOLVED | Noted: 2024-05-07 | Resolved: 2025-03-12

## 2025-03-12 PROCEDURE — 99396 PREV VISIT EST AGE 40-64: CPT | Performed by: PHYSICIAN ASSISTANT

## 2025-03-12 PROCEDURE — 99214 OFFICE O/P EST MOD 30 MIN: CPT | Performed by: PHYSICIAN ASSISTANT

## 2025-03-12 RX ORDER — GABAPENTIN 600 MG/1
1200 TABLET ORAL 3 TIMES DAILY
Qty: 540 TABLET | Refills: 1 | Status: SHIPPED | OUTPATIENT
Start: 2025-03-12 | End: 2025-09-08

## 2025-03-12 RX ORDER — DULOXETIN HYDROCHLORIDE 60 MG/1
60 CAPSULE, DELAYED RELEASE ORAL DAILY
Qty: 90 CAPSULE | Refills: 1 | Status: SHIPPED | OUTPATIENT
Start: 2025-03-12

## 2025-03-12 SDOH — ECONOMIC STABILITY: FOOD INSECURITY: WITHIN THE PAST 12 MONTHS, YOU WORRIED THAT YOUR FOOD WOULD RUN OUT BEFORE YOU GOT MONEY TO BUY MORE.: SOMETIMES TRUE

## 2025-03-12 SDOH — ECONOMIC STABILITY: FOOD INSECURITY: WITHIN THE PAST 12 MONTHS, THE FOOD YOU BOUGHT JUST DIDN'T LAST AND YOU DIDN'T HAVE MONEY TO GET MORE.: SOMETIMES TRUE

## 2025-03-12 ASSESSMENT — PATIENT HEALTH QUESTIONNAIRE - PHQ9
SUM OF ALL RESPONSES TO PHQ QUESTIONS 1-9: 7
1. LITTLE INTEREST OR PLEASURE IN DOING THINGS: SEVERAL DAYS
SUM OF ALL RESPONSES TO PHQ QUESTIONS 1-9: 7
4. FEELING TIRED OR HAVING LITTLE ENERGY: NEARLY EVERY DAY
3. TROUBLE FALLING OR STAYING ASLEEP: SEVERAL DAYS
7. TROUBLE CONCENTRATING ON THINGS, SUCH AS READING THE NEWSPAPER OR WATCHING TELEVISION: SEVERAL DAYS
9. THOUGHTS THAT YOU WOULD BE BETTER OFF DEAD, OR OF HURTING YOURSELF: NOT AT ALL
SUM OF ALL RESPONSES TO PHQ QUESTIONS 1-9: 7
6. FEELING BAD ABOUT YOURSELF - OR THAT YOU ARE A FAILURE OR HAVE LET YOURSELF OR YOUR FAMILY DOWN: NOT AT ALL
10. IF YOU CHECKED OFF ANY PROBLEMS, HOW DIFFICULT HAVE THESE PROBLEMS MADE IT FOR YOU TO DO YOUR WORK, TAKE CARE OF THINGS AT HOME, OR GET ALONG WITH OTHER PEOPLE: SOMEWHAT DIFFICULT
8. MOVING OR SPEAKING SO SLOWLY THAT OTHER PEOPLE COULD HAVE NOTICED. OR THE OPPOSITE, BEING SO FIGETY OR RESTLESS THAT YOU HAVE BEEN MOVING AROUND A LOT MORE THAN USUAL: NOT AT ALL
2. FEELING DOWN, DEPRESSED OR HOPELESS: SEVERAL DAYS
5. POOR APPETITE OR OVEREATING: NOT AT ALL
SUM OF ALL RESPONSES TO PHQ QUESTIONS 1-9: 7

## 2025-03-12 ASSESSMENT — ENCOUNTER SYMPTOMS
ABDOMINAL PAIN: 0
COUGH: 0
EYE PAIN: 0
CONSTIPATION: 0
DIARRHEA: 0
SINUS PRESSURE: 0
NAUSEA: 0
BLOOD IN STOOL: 0
SINUS PAIN: 0
BACK PAIN: 0
SORE THROAT: 0
SHORTNESS OF BREATH: 0
VOMITING: 0

## 2025-03-12 NOTE — PROGRESS NOTES
I have reviewed all needed documentation in preparation for visit. Verified patient by name and date of birth    Chief Complaint   Patient presents with    Annual Exam       \"Have you been to the ER, urgent care clinic since your last visit?  Hospitalized since your last visit?\"    NO    “Have you seen or consulted any other health care providers outside our system since your last visit?”    NO    “Have you had a diabetic eye exam?”    NO     Date of last diabetic eye exam: 2/13/2014            Vitals:    03/12/25 1215 03/12/25 1218   BP: (!) 162/95 (!) 172/96   BP Site: Right Upper Arm Right Lower Arm   Patient Position: Sitting Sitting   BP Cuff Size: Large Adult Medium Adult   Pulse: 86    Resp: 18    Temp: 99.4 °F (37.4 °C)    TempSrc: Temporal    SpO2: 99%    Weight: 93 kg (205 lb)    Height: 1.822 m (5' 11.75\")        Health Maintenance Due   Topic Date Due    Hepatitis B vaccine (1 of 3 - 19+ 3-dose series) Never done    DTaP/Tdap/Td vaccine (1 - Tdap) Never done    Diabetic retinal exam  02/13/2015    Diabetic foot exam  09/03/2022    COVID-19 Vaccine (2 - 2024-25 season) 09/01/2024       Hayde Rizo LPN

## 2025-03-12 NOTE — PROGRESS NOTES
Miguel Carroll is a 49 y.o. male  Chief Complaint   Patient presents with    Annual Exam     Vitals:    25 1218   BP: (!) 172/96   Pulse:    Resp:    Temp:    SpO2:       Wt Readings from Last 3 Encounters:   25 93 kg (205 lb)   24 112 kg (247 lb)   10/09/24 113.4 kg (250 lb)     BMI Readings from Last 3 Encounters:   25 28.00 kg/m²   25 32.59 kg/m²   24 32.59 kg/m²     Health Maintenance Due   Topic Date Due    Hepatitis B vaccine (1 of 3 - 19+ 3-dose series) Never done    DTaP/Tdap/Td vaccine (1 - Tdap) Never done    Diabetic retinal exam  2015    Diabetic foot exam  2022    COVID-19 Vaccine ( season) 2024     Social History     Tobacco Use    Smoking status: Former     Types: Cigarettes     Start date:      Quit date:      Years since quittin.2    Smokeless tobacco: Former     Quit date: 2009   Substance Use Topics    Alcohol use: Not Currently      Family History   Problem Relation Age of Onset    Hypertension Mother     Breast Cancer Mother     Uterine Cancer Mother     High Blood Pressure Mother     High Cholesterol Mother     Obesity Mother     Prostate Cancer Father     Hypertension Father     High Blood Pressure Father     Uterine Cancer Maternal Grandmother     Colon Cancer Maternal Grandmother     Lung Cancer Maternal Grandfather     Colon Cancer Paternal Grandmother     Anesth Problems Neg Hx       HPI  Pt presents for an Annual Physical.     Wt Readings from Last 3 Encounters:   25 93 kg (205 lb)   24 112 kg (247 lb)   10/09/24 113.4 kg (250 lb)   Sleeve Gastrectomy and lost weight.     MS - seeing Neurology regularly. Recently fired from job due to clumsiness with MS. Applying for disability.     Pain has been significant off of Cymbalta and taking reduced dose of Gabapentin (pt wanted to see if he could try stopping Gabapentin with weight loss, but pain was significant).     CV follow up  BP uncontrolled

## 2025-03-14 LAB
25(OH)D3+25(OH)D2 SERPL-MCNC: 26 NG/ML (ref 30–100)
ALBUMIN SERPL-MCNC: 4.2 G/DL (ref 4.1–5.1)
ALP SERPL-CCNC: 111 IU/L (ref 44–121)
ALT SERPL-CCNC: 31 IU/L (ref 0–44)
AST SERPL-CCNC: 29 IU/L (ref 0–40)
BILIRUB SERPL-MCNC: 0.7 MG/DL (ref 0–1.2)
BUN SERPL-MCNC: 11 MG/DL (ref 6–24)
BUN/CREAT SERPL: 14 (ref 9–20)
CALCIUM SERPL-MCNC: 9.3 MG/DL (ref 8.7–10.2)
CHLORIDE SERPL-SCNC: 101 MMOL/L (ref 96–106)
CHOLEST SERPL-MCNC: 215 MG/DL (ref 100–199)
CO2 SERPL-SCNC: 26 MMOL/L (ref 20–29)
CREAT SERPL-MCNC: 0.77 MG/DL (ref 0.76–1.27)
EGFRCR SERPLBLD CKD-EPI 2021: 110 ML/MIN/1.73
ERYTHROCYTE [DISTWIDTH] IN BLOOD BY AUTOMATED COUNT: 14.4 % (ref 11.6–15.4)
FOLATE SERPL-MCNC: 3.6 NG/ML
GLOBULIN SER CALC-MCNC: 2.8 G/DL (ref 1.5–4.5)
GLUCOSE SERPL-MCNC: 119 MG/DL (ref 70–99)
HBA1C MFR BLD: 5.7 % (ref 4.8–5.6)
HCT VFR BLD AUTO: 45.3 % (ref 37.5–51)
HDLC SERPL-MCNC: 58 MG/DL
HGB BLD-MCNC: 14.4 G/DL (ref 13–17.7)
IMP & REVIEW OF LAB RESULTS: NORMAL
LDLC SERPL CALC-MCNC: 146 MG/DL (ref 0–99)
MCH RBC QN AUTO: 25.7 PG (ref 26.6–33)
MCHC RBC AUTO-ENTMCNC: 31.8 G/DL (ref 31.5–35.7)
MCV RBC AUTO: 81 FL (ref 79–97)
PLATELET # BLD AUTO: 275 X10E3/UL (ref 150–450)
POTASSIUM SERPL-SCNC: 3 MMOL/L (ref 3.5–5.2)
PROT SERPL-MCNC: 7 G/DL (ref 6–8.5)
PSA SERPL-MCNC: 0.9 NG/ML (ref 0–4)
RBC # BLD AUTO: 5.61 X10E6/UL (ref 4.14–5.8)
SODIUM SERPL-SCNC: 143 MMOL/L (ref 134–144)
SPECIMEN STATUS REPORT: NORMAL
TRIGL SERPL-MCNC: 61 MG/DL (ref 0–149)
TSH SERPL DL<=0.005 MIU/L-ACNC: 0.83 UIU/ML (ref 0.45–4.5)
VIT B12 SERPL-MCNC: 347 PG/ML (ref 232–1245)
VLDLC SERPL CALC-MCNC: 11 MG/DL (ref 5–40)
WBC # BLD AUTO: 4.8 X10E3/UL (ref 3.4–10.8)

## 2025-03-18 ENCOUNTER — RESULTS FOLLOW-UP (OUTPATIENT)
Age: 50
End: 2025-03-18

## 2025-03-18 DIAGNOSIS — E55.9 VITAMIN D DEFICIENCY: ICD-10-CM

## 2025-03-18 DIAGNOSIS — E87.6 HYPOKALEMIA: Primary | ICD-10-CM

## 2025-03-18 RX ORDER — ACETAMINOPHEN 160 MG
2000 TABLET,DISINTEGRATING ORAL DAILY
COMMUNITY

## 2025-03-19 ENCOUNTER — PROCEDURE VISIT (OUTPATIENT)
Age: 50
End: 2025-03-19
Payer: MEDICAID

## 2025-03-19 ENCOUNTER — OFFICE VISIT (OUTPATIENT)
Age: 50
End: 2025-03-19
Payer: MEDICAID

## 2025-03-19 DIAGNOSIS — R41.89 COGNITIVE DECLINE: ICD-10-CM

## 2025-03-19 DIAGNOSIS — F41.9 MILD ANXIETY: ICD-10-CM

## 2025-03-19 DIAGNOSIS — F03.B0 MODERATE DEMENTIA WITHOUT BEHAVIORAL DISTURBANCE, PSYCHOTIC DISTURBANCE, MOOD DISTURBANCE, OR ANXIETY, UNSPECIFIED DEMENTIA TYPE (HCC): Primary | ICD-10-CM

## 2025-03-19 DIAGNOSIS — G31.84 MILD COGNITIVE IMPAIRMENT: Primary | ICD-10-CM

## 2025-03-19 PROCEDURE — 96133 NRPSYC TST EVAL PHYS/QHP EA: CPT | Performed by: CLINICAL NEUROPSYCHOLOGIST

## 2025-03-19 PROCEDURE — 96139 PSYCL/NRPSYC TST TECH EA: CPT | Performed by: CLINICAL NEUROPSYCHOLOGIST

## 2025-03-19 PROCEDURE — 96138 PSYCL/NRPSYC TECH 1ST: CPT | Performed by: CLINICAL NEUROPSYCHOLOGIST

## 2025-03-19 PROCEDURE — 96132 NRPSYC TST EVAL PHYS/QHP 1ST: CPT | Performed by: CLINICAL NEUROPSYCHOLOGIST

## 2025-03-19 PROCEDURE — 90791 PSYCH DIAGNOSTIC EVALUATION: CPT | Performed by: CLINICAL NEUROPSYCHOLOGIST

## 2025-03-19 NOTE — PROGRESS NOTES
Plan:  Obtain authorization for testing from insurance company.  Report to follow once testing, scoring, and interpretation completed.  ? Organic based neurocognitive issues versus mood disorder or combination of same.  ? Problems organic, functional, or both? The patient has not gotten better from any treatment to date.  Treatment decisions cannot be appropriately made without testing.  The patient is not abusing drugs.  There is a suspected brain problem, which can be identified, quantified, and hopefully addressed via neurocognitive and psychological testing.      This note will not be viewable in [facility].

## 2025-03-24 NOTE — PROGRESS NOTES
ROBOTIC SLEEVE GASTRECTOMY SINGLE ANASTOMOSIS DUODENAL ILEOSTOMY (E R A S) performed by Ravi Tobin MD at [facility] MAIN OR       No Known Allergies    Family History   Problem Relation Age of Onset    Hypertension Mother     Breast Cancer Mother     Uterine Cancer Mother     High Blood Pressure Mother     High Cholesterol Mother     Obesity Mother     Prostate Cancer Father     Hypertension Father     High Blood Pressure Father     Uterine Cancer Maternal Grandmother     Colon Cancer Maternal Grandmother     Lung Cancer Maternal Grandfather     Colon Cancer Paternal Grandmother     Anesth Problems Neg Hx        Social History     Tobacco Use    Smoking status: Former     Types: Cigarettes     Start date:      Quit date:      Years since quittin.2    Smokeless tobacco: Former     Quit date: 2009   Vaping Use    Vaping status: Never Used   Substance Use Topics    Alcohol use: Not Currently    Drug use: Not Currently     Frequency: 14.0 times per week     Types: Marijuana (Weed)     Comment: stopped 4 months ago       Current Outpatient Medications   Medication Sig Dispense Refill    vitamin D (VITAMIN D3) 50 MCG (2000) CAPS capsule Take 1 capsule by mouth daily      gabapentin (NEURONTIN) 600 MG tablet Take 2 tablets by mouth 3 times daily for 180 days. Max Daily Amount: 3,600 mg 540 tablet 1    DULoxetine (CYMBALTA) 60 MG extended release capsule Take 1 capsule by mouth daily 90 capsule 1    dalfampridine (AMPYRA) 10 MG extended release tablet Take 1 tablet by mouth in the morning and 1 tablet in the evening. 180 tablet 1    Multiple Vitamins-Minerals (BARIATRIC MULTIVITAMINS/IRON PO) Take by mouth Bariatric Pal       No current facility-administered medications for this visit.     Chief Complaint: Cognitive and/Or Behavioral Changes    Neuropsychological Evaluation  Patient Testing 3/19/2025 Report Completed 3/23/2025  A Psychometrist Assisted w/ portions of this evaluation while under my direct

## 2025-04-08 ENCOUNTER — TELEPHONE (OUTPATIENT)
Age: 50
End: 2025-04-08

## 2025-04-08 NOTE — TELEPHONE ENCOUNTER
[facility] have attempted to reach the patient to have the delivery scheduled for  dalfampridine but have been unable to contact him. Paula states that the medication will be on hold until the patient contacts them at [phone].

## 2025-04-16 NOTE — PROGRESS NOTES
Neurology Progress Note    Patient ID:  Miguel Carroll  [id number]  49 y.o.  1975      Subjective:   History:  Mr. Carroll with R knee surgery, diabetes, BPH, HTN, hypercholesterol, obesity previously seen by Dr Reich for MS. Completed [facility] in - . Currently not on medication for MS. Baseline has numbness of both lower extremities and some weakness of both legs needing to use a walker. For the past 1 yr, patient noted R forearm pain. Takes Gabapentin 100 mg QHS and Cymbalta 60 mg QD c/o PCP. Takes Vit D3. Since last time, patient had weight loss surgery and lost 100 lbs so far.    Patient feels that his legs are getting weaker with increased R lower back pain radiating to R leg. (+) R knee grinding.    Has not seen pain management. Has done physical therapy.     Now uses an upright walker.     Still on Ampyra 10 mg BID with increase speed of walking.        ROS:  Per HPI-  Otherwise the remainder of ROS was negative    Social Hx  Social History     Socioeconomic History    Marital status:    Tobacco Use    Smoking status: Former     Types: Cigarettes     Start date:      Quit date: 2004     Years since quittin.3    Smokeless tobacco: Former     Quit date: 2009   Vaping Use    Vaping status: Never Used   Substance and Sexual Activity    Alcohol use: Not Currently    Drug use: Not Currently     Frequency: 14.0 times per week     Types: Marijuana (Weed)     Comment: stopped 4 months ago    Sexual activity: Yes     Partners: Female   Social History Narrative    Worked as a  x 12 years prior to changing career    Works in IS for [employer]      In the home with spouse and 4 children ages 21-12 (all well)     Social Drivers of Health     Financial Resource Strain: Low Risk  (10/8/2024)    Overall Financial Resource Strain (CARDIA)     Difficulty of Paying Living Expenses: Not very hard   Food Insecurity: Food Insecurity Present (3/12/2025)    Hunger Vital Sign

## 2025-04-17 ENCOUNTER — OFFICE VISIT (OUTPATIENT)
Age: 50
End: 2025-04-17
Payer: MEDICAID

## 2025-04-17 VITALS
HEIGHT: 73 IN | SYSTOLIC BLOOD PRESSURE: 118 MMHG | TEMPERATURE: 95.1 F | OXYGEN SATURATION: 98 % | DIASTOLIC BLOOD PRESSURE: 100 MMHG | HEART RATE: 118 BPM | BODY MASS INDEX: 27.05 KG/M2

## 2025-04-17 DIAGNOSIS — M54.41 CHRONIC RIGHT-SIDED LOW BACK PAIN WITH RIGHT-SIDED SCIATICA: ICD-10-CM

## 2025-04-17 DIAGNOSIS — R53.1 WEAKNESS: ICD-10-CM

## 2025-04-17 DIAGNOSIS — G35 MULTIPLE SCLEROSIS (HCC): Primary | ICD-10-CM

## 2025-04-17 DIAGNOSIS — G89.29 CHRONIC RIGHT-SIDED LOW BACK PAIN WITH RIGHT-SIDED SCIATICA: ICD-10-CM

## 2025-04-17 PROCEDURE — 99215 OFFICE O/P EST HI 40 MIN: CPT | Performed by: PSYCHIATRY & NEUROLOGY

## 2025-04-17 PROCEDURE — 3074F SYST BP LT 130 MM HG: CPT | Performed by: PSYCHIATRY & NEUROLOGY

## 2025-04-17 PROCEDURE — 3080F DIAST BP >= 90 MM HG: CPT | Performed by: PSYCHIATRY & NEUROLOGY

## 2025-04-28 RX ORDER — SODIUM CHLORIDE 9 MG/ML
5-250 INJECTION, SOLUTION INTRAVENOUS PRN
Status: CANCELLED | OUTPATIENT
Start: 2025-05-05

## 2025-04-28 RX ORDER — HEPARIN 100 UNIT/ML
500 SYRINGE INTRAVENOUS PRN
Status: CANCELLED | OUTPATIENT
Start: 2025-05-05

## 2025-04-28 RX ORDER — SODIUM CHLORIDE 0.9 % (FLUSH) 0.9 %
5-40 SYRINGE (ML) INJECTION PRN
Status: CANCELLED | OUTPATIENT
Start: 2025-05-05

## 2025-04-29 ENCOUNTER — TELEPHONE (OUTPATIENT)
Age: 50
End: 2025-04-29

## 2025-04-29 NOTE — TELEPHONE ENCOUNTER
LM for pt to call and schedule annual bariatric exam.  Bilna message also sent. Letter sent. [facility]

## 2025-04-30 ENCOUNTER — TELEMEDICINE (OUTPATIENT)
Age: 50
End: 2025-04-30

## 2025-04-30 VITALS — BODY MASS INDEX: 26.24 KG/M2 | HEIGHT: 73 IN | WEIGHT: 198 LBS

## 2025-04-30 DIAGNOSIS — E66.01 MORBID OBESITY (HCC): Primary | ICD-10-CM

## 2025-04-30 DIAGNOSIS — K91.2 POSTSURGICAL NONABSORPTION: ICD-10-CM

## 2025-04-30 PROCEDURE — 99024 POSTOP FOLLOW-UP VISIT: CPT | Performed by: NURSE PRACTITIONER

## 2025-04-30 ASSESSMENT — PATIENT HEALTH QUESTIONNAIRE - PHQ9
SUM OF ALL RESPONSES TO PHQ QUESTIONS 1-9: 0
SUM OF ALL RESPONSES TO PHQ QUESTIONS 1-9: 0
2. FEELING DOWN, DEPRESSED OR HOPELESS: NOT AT ALL
5. POOR APPETITE OR OVEREATING: NOT AT ALL
4. FEELING TIRED OR HAVING LITTLE ENERGY: NOT AT ALL
10. IF YOU CHECKED OFF ANY PROBLEMS, HOW DIFFICULT HAVE THESE PROBLEMS MADE IT FOR YOU TO DO YOUR WORK, TAKE CARE OF THINGS AT HOME, OR GET ALONG WITH OTHER PEOPLE: NOT DIFFICULT AT ALL
9. THOUGHTS THAT YOU WOULD BE BETTER OFF DEAD, OR OF HURTING YOURSELF: NOT AT ALL
SUM OF ALL RESPONSES TO PHQ QUESTIONS 1-9: 0
SUM OF ALL RESPONSES TO PHQ QUESTIONS 1-9: 0
3. TROUBLE FALLING OR STAYING ASLEEP: NOT AT ALL
7. TROUBLE CONCENTRATING ON THINGS, SUCH AS READING THE NEWSPAPER OR WATCHING TELEVISION: NOT AT ALL
8. MOVING OR SPEAKING SO SLOWLY THAT OTHER PEOPLE COULD HAVE NOTICED. OR THE OPPOSITE, BEING SO FIGETY OR RESTLESS THAT YOU HAVE BEEN MOVING AROUND A LOT MORE THAN USUAL: NOT AT ALL
6. FEELING BAD ABOUT YOURSELF - OR THAT YOU ARE A FAILURE OR HAVE LET YOURSELF OR YOUR FAMILY DOWN: NOT AT ALL
1. LITTLE INTEREST OR PLEASURE IN DOING THINGS: NOT AT ALL

## 2025-04-30 ASSESSMENT — PAIN SCALES - GENERAL: PAINLEVEL_OUTOF10: 0

## 2025-04-30 ASSESSMENT — ENCOUNTER SYMPTOMS
ABDOMINAL PAIN: 0
NAUSEA: 0
SHORTNESS OF BREATH: 0
VOMITING: 0

## 2025-04-30 NOTE — PROGRESS NOTES
Identified patient with two patient identifiers (name and ). Reviewed chart in preparation for visit and have obtained necessary documentation.    Miguel Carroll is a 49 y.o. male  Chief Complaint   Patient presents with    Weight Management     10 months post ELLIOTT     Ht 1.854 m (6' 1\")   Wt 89.8 kg (198 lb)   BMI 26.12 kg/m²     1. Have you been to the ER, urgent care clinic since your last visit?  Hospitalized since your last visit?no    2. Have you seen or consulted any other health care providers outside of the [facility] since your last visit?  Include any pap smears or colon screening. no

## 2025-04-30 NOTE — PROGRESS NOTES
Miguel Carroll (:  1975) is a 49 y.o. male,Established patient, here for evaluation of the following chief complaint(s):  Weight Management (10 months post ELLIOTT)        SUBJECTIVE/OBJECTIVE:    HPI:  Miguel Carroll is a 49 y.o. male with previous ELLIOTT surgery on 10 months ago. . He has lost a total of 107  pounds since surgery.   Body mass index is 26.12 kg/m².. no nausea and no vomiting . Denies Acid reflux/heartburn.. Drinking  64 ounces of water daily. 40 grams protein intake daily. + BM's. Pt is walking  for exercise. He is struggling to get all of his protein in and he feels fatigued when he falls behind.   Dietary recall -    Breakfast- boiled eggs, turkey gray, yogurt with protein  Lunch- salad and meat  Dinner- meat and veg    He is  snacking between meals; lulu.      Vitamins:  MVI : yes  Calcium : yes  B-Vit 12: yes  Vit D: Yes          Mr. Carroll has a reminder for a \"due or due soon\" health maintenance. I have asked that he contact his primary care provider for follow-up on this health maintenance.              Current Outpatient Medications:     vitamin D (VITAMIN D3) 50 MCG (2000 UT) CAPS capsule, Take 1 capsule by mouth daily, Disp: , Rfl:     gabapentin (NEURONTIN) 600 MG tablet, Take 2 tablets by mouth 3 times daily for 180 days. Max Daily Amount: 3,600 mg, Disp: 540 tablet, Rfl: 1    DULoxetine (CYMBALTA) 60 MG extended release capsule, Take 1 capsule by mouth daily, Disp: 90 capsule, Rfl: 1    dalfampridine (AMPYRA) 10 MG extended release tablet, Take 1 tablet by mouth in the morning and 1 tablet in the evening., Disp: 180 tablet, Rfl: 1    Multiple Vitamins-Minerals (BARIATRIC MULTIVITAMINS/IRON PO), Take by mouth Bariatric Pal, Disp: , Rfl:       Ht 1.854 m (6' 1\")   Wt 89.8 kg (198 lb)   BMI 26.12 kg/m²    Review of Systems   Respiratory:  Negative for shortness of breath.    Cardiovascular:  Negative for chest pain.   Gastrointestinal:  Negative for abdominal pain,

## 2025-05-05 ENCOUNTER — HOSPITAL ENCOUNTER (OUTPATIENT)
Facility: HOSPITAL | Age: 50
Setting detail: INFUSION SERIES
Discharge: HOME | End: 2025-05-05
Payer: MEDICAID

## 2025-05-05 VITALS
DIASTOLIC BLOOD PRESSURE: 96 MMHG | RESPIRATION RATE: 18 BRPM | HEART RATE: 76 BPM | SYSTOLIC BLOOD PRESSURE: 162 MMHG | OXYGEN SATURATION: 96 % | TEMPERATURE: 98.2 F

## 2025-05-05 DIAGNOSIS — G35 MULTIPLE SCLEROSIS (HCC): Primary | ICD-10-CM

## 2025-05-05 PROCEDURE — 6360000002 HC RX W HCPCS: Performed by: PSYCHIATRY & NEUROLOGY

## 2025-05-05 PROCEDURE — 2580000003 HC RX 258: Performed by: PSYCHIATRY & NEUROLOGY

## 2025-05-05 PROCEDURE — 96365 THER/PROPH/DIAG IV INF INIT: CPT

## 2025-05-05 RX ORDER — HEPARIN 100 UNIT/ML
500 SYRINGE INTRAVENOUS PRN
Status: CANCELLED | OUTPATIENT
Start: 2025-05-06

## 2025-05-05 RX ORDER — SODIUM CHLORIDE 9 MG/ML
5-250 INJECTION, SOLUTION INTRAVENOUS PRN
Status: CANCELLED | OUTPATIENT
Start: 2025-05-06

## 2025-05-05 RX ORDER — SODIUM CHLORIDE 0.9 % (FLUSH) 0.9 %
5-40 SYRINGE (ML) INJECTION PRN
Status: CANCELLED | OUTPATIENT
Start: 2025-05-06

## 2025-05-05 RX ORDER — SODIUM CHLORIDE 9 MG/ML
5-250 INJECTION, SOLUTION INTRAVENOUS PRN
Status: DISCONTINUED | OUTPATIENT
Start: 2025-05-05 | End: 2025-05-06 | Stop reason: HOSPADM

## 2025-05-05 RX ADMIN — SODIUM CHLORIDE 50 ML/HR: 9 INJECTION, SOLUTION INTRAVENOUS at 10:07

## 2025-05-05 RX ADMIN — SODIUM CHLORIDE 1000 MG: 9 INJECTION, SOLUTION INTRAVENOUS at 10:11

## 2025-05-05 ASSESSMENT — PAIN SCALES - GENERAL: PAINLEVEL_OUTOF10: 0

## 2025-05-05 NOTE — PROGRESS NOTES
Pt arrived to [facility] for Solu-Medrol infusion in stable condition.  Assessment completed. 24G PIV inserted in RAC with positive blood return.      Vitals:    05/05/25 0951   BP: (!) 162/96   Pulse: 76   Resp: 18   Temp: 98.2 °F (36.8 °C)   SpO2: 96%     Medications Administered         0.9 % sodium chloride infusion Admin Date  05/05/2025 Action  New Bag Dose  50 mL/hr Rate  50 mL/hr Route  IntraVENous Documented By  Stephanie Worley, RN        methylPREDNISolone (Solu-MEDROL) 1000 mg in 100 mL NS IVPB (addEASE) Admin Date  05/05/2025 Action  New Bag Dose  1,000 mg Rate  100 mL/hr Route  IntraVENous Documented By  Stephanie Worley, RN              PIV flushed and removed per protocol. Pt tolerated treatment well. D/Cd from [facility] in no distress.    Future Appointments   Date Time Provider Department Center   5/6/2025  9:30 AM SS CHEMO CHAIR 23 [facility]   5/7/2025  9:30 AM SS CHEMO CHAIR 8 [facility]   5/8/2025  9:30 AM SS CHEMO CHAIR 27 [facility]   5/9/2025  9:30 AM SS CHEMO CHAIR 4 [facility]   7/1/2025 11:00 AM [facility]   7/9/2025  9:00 AM Barrie Mills MD BSMGNCR BS AMB   8/14/2025  1:20 PM Barrie Mills MD NEUROWRSPPBB BS AMB

## 2025-05-06 ENCOUNTER — HOSPITAL ENCOUNTER (OUTPATIENT)
Facility: HOSPITAL | Age: 50
Setting detail: INFUSION SERIES
Discharge: HOME | End: 2025-05-06
Payer: MEDICAID

## 2025-05-06 VITALS
SYSTOLIC BLOOD PRESSURE: 160 MMHG | TEMPERATURE: 97.7 F | HEIGHT: 73 IN | HEART RATE: 74 BPM | WEIGHT: 202.7 LBS | OXYGEN SATURATION: 97 % | DIASTOLIC BLOOD PRESSURE: 83 MMHG | RESPIRATION RATE: 18 BRPM | BODY MASS INDEX: 26.86 KG/M2

## 2025-05-06 DIAGNOSIS — G35 MULTIPLE SCLEROSIS (HCC): Primary | ICD-10-CM

## 2025-05-06 PROCEDURE — 96365 THER/PROPH/DIAG IV INF INIT: CPT

## 2025-05-06 PROCEDURE — 2580000003 HC RX 258: Performed by: PSYCHIATRY & NEUROLOGY

## 2025-05-06 PROCEDURE — 6360000002 HC RX W HCPCS: Performed by: PSYCHIATRY & NEUROLOGY

## 2025-05-06 RX ORDER — SODIUM CHLORIDE 9 MG/ML
5-250 INJECTION, SOLUTION INTRAVENOUS PRN
Status: CANCELLED | OUTPATIENT
Start: 2025-05-07

## 2025-05-06 RX ORDER — HEPARIN 100 UNIT/ML
500 SYRINGE INTRAVENOUS PRN
Status: CANCELLED | OUTPATIENT
Start: 2025-05-07

## 2025-05-06 RX ORDER — SODIUM CHLORIDE 9 MG/ML
5-250 INJECTION, SOLUTION INTRAVENOUS PRN
Status: DISCONTINUED | OUTPATIENT
Start: 2025-05-06 | End: 2025-05-07 | Stop reason: HOSPADM

## 2025-05-06 RX ORDER — SODIUM CHLORIDE 0.9 % (FLUSH) 0.9 %
5-40 SYRINGE (ML) INJECTION PRN
Status: CANCELLED | OUTPATIENT
Start: 2025-05-07

## 2025-05-06 RX ADMIN — SODIUM CHLORIDE 25 ML/HR: 9 INJECTION, SOLUTION INTRAVENOUS at 09:44

## 2025-05-06 RX ADMIN — SODIUM CHLORIDE 1000 MG: 9 INJECTION, SOLUTION INTRAVENOUS at 09:51

## 2025-05-06 ASSESSMENT — PAIN DESCRIPTION - FREQUENCY: FREQUENCY: INTERMITTENT

## 2025-05-06 ASSESSMENT — PAIN SCALES - GENERAL: PAINLEVEL_OUTOF10: 2

## 2025-05-06 ASSESSMENT — PAIN DESCRIPTION - DESCRIPTORS: DESCRIPTORS: ACHING

## 2025-05-06 ASSESSMENT — PAIN DESCRIPTION - ONSET: ONSET: ON-GOING

## 2025-05-06 ASSESSMENT — PAIN DESCRIPTION - PAIN TYPE: TYPE: CHRONIC PAIN

## 2025-05-06 ASSESSMENT — PAIN - FUNCTIONAL ASSESSMENT: PAIN_FUNCTIONAL_ASSESSMENT: ACTIVITIES ARE NOT PREVENTED

## 2025-05-06 ASSESSMENT — PAIN DESCRIPTION - ORIENTATION: ORIENTATION: LOWER

## 2025-05-06 ASSESSMENT — PAIN DESCRIPTION - LOCATION: LOCATION: BACK

## 2025-05-06 NOTE — PROGRESS NOTES
Outpatient Infusion Center Progress Note        Date: May 6, 2025    Name: Miguel Carroll    MRN: [id number]         : 1975    Mr. Carroll Arrived ambulatory with walker and in no distress for Solu-medrol Day 2/ Regimen.  Assessment was completed, no acute issues at this time, no new complaints voiced. RAC24G PIV placed with +blood return. BP elevated today, denied  headache or vision changes, pt  stated that he took  BP med this morning.     0951: at the beginning of infusion, noticed PIV infiltrated, PIV removed per protocol.  No redness or complication noted. New PIV 24G  placed at the [facility] with +blood  return and resumed infusion.     Mr. Carroll's vitals were reviewed.  Patient Vitals for the past 12 hrs:   Temp Pulse Resp BP SpO2   25 1135 -- 74 18 (!) 160/83 --   25 0930 97.7 °F (36.5 °C) 70 18 (!) 169/87 97 %        Medications received:  Medications Administered         0.9 % sodium chloride infusion Admin Date  2025 Action  New Bag Dose  25 mL/hr Rate  25 mL/hr Route  IntraVENous Documented By  Devorah Hughes RN        methylPREDNISolone (Solu-MEDROL) 1000 mg in 100 mL NS IVPB (addEASE) Admin Date  2025 Action  New Bag Dose  1,000 mg Rate  100 mL/hr Route  IntraVENous Documented By  Devorah Hughes, RN             Mr. Carroll tolerated treatment well and was discharged from Outpatient Infusion Center in stable condition at 1140. PIV maintained +blood return through out the infusion, flushed and removed per protocol. He is to return on  May 7, 2025 at 0930 for his next appointment.    Devorah Hughes RN  May 6, 2025    Future Appointments:  Future Appointments   Date Time Provider Department Center   2025  9:30 AM SS CHEMO CHAIR 8 [facility]   2025  9:30 AM SS CHEMO CHAIR 27 [facility]   2025  9:30 AM SS CHEMO CHAIR 4 [facility]   2025 11:00 AM Vanita Urbina PA-C RSDUONGC BS ECC DEP   2025  9:00 AM [facility]

## 2025-05-07 ENCOUNTER — HOSPITAL ENCOUNTER (OUTPATIENT)
Facility: HOSPITAL | Age: 50
Setting detail: INFUSION SERIES
Discharge: HOME | End: 2025-05-07
Payer: MEDICAID

## 2025-05-07 VITALS
HEART RATE: 65 BPM | TEMPERATURE: 97.9 F | DIASTOLIC BLOOD PRESSURE: 81 MMHG | OXYGEN SATURATION: 99 % | SYSTOLIC BLOOD PRESSURE: 171 MMHG | RESPIRATION RATE: 17 BRPM

## 2025-05-07 DIAGNOSIS — G35 MULTIPLE SCLEROSIS (HCC): Primary | ICD-10-CM

## 2025-05-07 PROCEDURE — 2580000003 HC RX 258: Performed by: PSYCHIATRY & NEUROLOGY

## 2025-05-07 PROCEDURE — 96365 THER/PROPH/DIAG IV INF INIT: CPT

## 2025-05-07 PROCEDURE — 6360000002 HC RX W HCPCS: Performed by: PSYCHIATRY & NEUROLOGY

## 2025-05-07 RX ORDER — HEPARIN 100 UNIT/ML
500 SYRINGE INTRAVENOUS PRN
Status: CANCELLED | OUTPATIENT
Start: 2025-05-08

## 2025-05-07 RX ORDER — SODIUM CHLORIDE 9 MG/ML
5-250 INJECTION, SOLUTION INTRAVENOUS PRN
Status: CANCELLED | OUTPATIENT
Start: 2025-05-08

## 2025-05-07 RX ORDER — SODIUM CHLORIDE 9 MG/ML
5-250 INJECTION, SOLUTION INTRAVENOUS PRN
Status: DISCONTINUED | OUTPATIENT
Start: 2025-05-07 | End: 2025-05-08 | Stop reason: HOSPADM

## 2025-05-07 RX ORDER — SODIUM CHLORIDE 0.9 % (FLUSH) 0.9 %
5-40 SYRINGE (ML) INJECTION PRN
Status: CANCELLED | OUTPATIENT
Start: 2025-05-08

## 2025-05-07 RX ADMIN — SODIUM CHLORIDE 5 ML/HR: 9 INJECTION, SOLUTION INTRAVENOUS at 09:35

## 2025-05-07 RX ADMIN — SODIUM CHLORIDE 1000 MG: 9 INJECTION, SOLUTION INTRAVENOUS at 09:35

## 2025-05-07 ASSESSMENT — PAIN DESCRIPTION - LOCATION: LOCATION: BACK

## 2025-05-07 ASSESSMENT — PAIN DESCRIPTION - ORIENTATION: ORIENTATION: LOWER

## 2025-05-07 ASSESSMENT — PAIN SCALES - GENERAL: PAINLEVEL_OUTOF10: 2

## 2025-05-07 ASSESSMENT — PAIN DESCRIPTION - DESCRIPTORS: DESCRIPTORS: ACHING

## 2025-05-07 ASSESSMENT — PAIN DESCRIPTION - PAIN TYPE: TYPE: CHRONIC PAIN

## 2025-05-07 ASSESSMENT — PAIN - FUNCTIONAL ASSESSMENT: PAIN_FUNCTIONAL_ASSESSMENT: ACTIVITIES ARE NOT PREVENTED

## 2025-05-07 NOTE — PROGRESS NOTES
[facility] Progress Note    Date: May 7, 2025        0930: Pt arrived ambulatory to [facility] for solumedrol day 3/5 in stable condition.  Assessment completed. PIV placed to Right AC with positive blood return. Criteria for treatment was met.    Patient Vitals for the past 12 hrs:   Temp Pulse Resp BP SpO2   05/07/25 1101 -- 65 17 (!) 171/81 --   05/07/25 0919 97.9 °F (36.6 °C) 60 17 (!) 157/87 99 %         Medications Administered         0.9 % sodium chloride infusion Admin Date  05/07/2025 Action  New Bag Dose  5 mL/hr Rate  5 mL/hr Route  IntraVENous Documented By  Mansi Johansen, RN        methylPREDNISolone (Solu-MEDROL) 1000 mg in 100 mL NS IVPB (addEASE) Admin Date  05/07/2025 Action  New Bag Dose  1,000 mg Rate  100 mL/hr Route  IntraVENous Documented By  Mansi Johansen, RN               Mr. Carroll tolerated the infusion, and had no complaints.    PIV flushed and removed. 2x2 and coban placed    Mr. Carroll was discharged from Outpatient Infusion Center in stable condition. Patient is aware of next scheduled [facility] appointment 5/8/25 at 9:30 AM.         Future Appointments   Date Time Provider Department Center   5/8/2025  9:30 AM SS CHEMO CHAIR 27 [facility]   5/9/2025  9:30 AM SS CHEMO CHAIR 4 [facility]   7/1/2025 11:00 AM Vanita Urbina PA-C RSCPC BS ECC DEP   7/9/2025  9:00 AM Barrie Mills MD BSMGNCR BS AMB   8/14/2025  1:20 PM Barrie Mills MD NEUROWRSPPBB BS [facility]         Mansi Johansen, JAMES, RN  May 7, 2025

## 2025-05-08 ENCOUNTER — HOSPITAL ENCOUNTER (OUTPATIENT)
Facility: HOSPITAL | Age: 50
Setting detail: INFUSION SERIES
Discharge: HOME | End: 2025-05-08
Payer: MEDICAID

## 2025-05-08 VITALS
SYSTOLIC BLOOD PRESSURE: 157 MMHG | DIASTOLIC BLOOD PRESSURE: 84 MMHG | RESPIRATION RATE: 18 BRPM | TEMPERATURE: 97.6 F | HEART RATE: 59 BPM

## 2025-05-08 DIAGNOSIS — G35 MULTIPLE SCLEROSIS (HCC): Primary | ICD-10-CM

## 2025-05-08 PROCEDURE — 2580000003 HC RX 258: Performed by: PSYCHIATRY & NEUROLOGY

## 2025-05-08 PROCEDURE — 96365 THER/PROPH/DIAG IV INF INIT: CPT

## 2025-05-08 PROCEDURE — 6360000002 HC RX W HCPCS: Performed by: PSYCHIATRY & NEUROLOGY

## 2025-05-08 RX ORDER — SODIUM CHLORIDE 9 MG/ML
5-250 INJECTION, SOLUTION INTRAVENOUS PRN
Status: CANCELLED | OUTPATIENT
Start: 2025-05-09

## 2025-05-08 RX ORDER — HEPARIN 100 UNIT/ML
500 SYRINGE INTRAVENOUS PRN
Status: CANCELLED | OUTPATIENT
Start: 2025-05-09

## 2025-05-08 RX ORDER — SODIUM CHLORIDE 0.9 % (FLUSH) 0.9 %
5-40 SYRINGE (ML) INJECTION PRN
Status: CANCELLED | OUTPATIENT
Start: 2025-05-09

## 2025-05-08 RX ADMIN — SODIUM CHLORIDE 1000 MG: 9 INJECTION, SOLUTION INTRAVENOUS at 09:43

## 2025-05-08 ASSESSMENT — PAIN DESCRIPTION - LOCATION: LOCATION: BACK

## 2025-05-08 ASSESSMENT — PAIN DESCRIPTION - DESCRIPTORS: DESCRIPTORS: ACHING

## 2025-05-08 ASSESSMENT — PAIN SCALES - GENERAL: PAINLEVEL_OUTOF10: 2

## 2025-05-08 ASSESSMENT — PAIN DESCRIPTION - ORIENTATION: ORIENTATION: LOWER

## 2025-05-08 NOTE — PROGRESS NOTES
[facility] Progress Note    Date: May 8, 2025        0930: Pt arrived ambulatory with rollator to [facility] for Solumedrol Day 4 of 5 in stable condition.  Assessment completed. PIV placed to right AC with positive blood return.     Criteria for treatment was met.    Patient Vitals for the past 12 hrs:   Temp Pulse Resp BP   05/08/25 0933 97.6 °F (36.4 °C) 59 18 (!) 157/84       Medications Administered         methylPREDNISolone (Solu-MEDROL) 1000 mg in 100 mL NS IVPB (addEASE) Admin Date  05/08/2025 Action  New Bag Dose  1,000 mg Rate  100 mL/hr Route  IntraVENous Documented By  Nesha Wiseman, JAMES             1100:  Tolerated treatment well, no adverse reactions noted. PIV flushed and removed. 2x2 and coban placed. D/Cd from [facility] ambulatory and in no distress.  Patient is aware of next scheduled [facility] appointment on 5/9/2025.    Future Appointments   Date Time Provider Department Center   5/9/2025  9:30 AM SS CHEMO CHAIR 4 [facility]   7/1/2025 11:00 AM Vanita Urbina PA-C RSCPC BS ECC DEP   7/9/2025  9:00 AM Barrie Mills MD BSMGNCR BS AMB   8/14/2025  1:20 PM Barrie Mills MD NEUROWRSPPBB BS AMB           Nesha Wiseman, JAMES  May 8, 2025

## 2025-05-09 ENCOUNTER — HOSPITAL ENCOUNTER (OUTPATIENT)
Facility: HOSPITAL | Age: 50
Setting detail: INFUSION SERIES
Discharge: HOME | End: 2025-05-09
Payer: MEDICAID

## 2025-05-09 VITALS
SYSTOLIC BLOOD PRESSURE: 165 MMHG | DIASTOLIC BLOOD PRESSURE: 91 MMHG | WEIGHT: 201.3 LBS | HEART RATE: 63 BPM | RESPIRATION RATE: 18 BRPM | TEMPERATURE: 97.4 F | OXYGEN SATURATION: 100 % | BODY MASS INDEX: 26.56 KG/M2

## 2025-05-09 DIAGNOSIS — G35 MULTIPLE SCLEROSIS (HCC): Primary | ICD-10-CM

## 2025-05-09 PROCEDURE — 96365 THER/PROPH/DIAG IV INF INIT: CPT

## 2025-05-09 PROCEDURE — 6360000002 HC RX W HCPCS: Performed by: PSYCHIATRY & NEUROLOGY

## 2025-05-09 PROCEDURE — 2580000003 HC RX 258: Performed by: PSYCHIATRY & NEUROLOGY

## 2025-05-09 RX ORDER — SODIUM CHLORIDE 9 MG/ML
5-250 INJECTION, SOLUTION INTRAVENOUS PRN
OUTPATIENT
Start: 2025-05-10

## 2025-05-09 RX ORDER — HEPARIN 100 UNIT/ML
500 SYRINGE INTRAVENOUS PRN
OUTPATIENT
Start: 2025-05-10

## 2025-05-09 RX ORDER — SODIUM CHLORIDE 0.9 % (FLUSH) 0.9 %
5-40 SYRINGE (ML) INJECTION PRN
OUTPATIENT
Start: 2025-05-10

## 2025-05-09 RX ORDER — SODIUM CHLORIDE 9 MG/ML
5-250 INJECTION, SOLUTION INTRAVENOUS PRN
Status: DISCONTINUED | OUTPATIENT
Start: 2025-05-09 | End: 2025-05-10 | Stop reason: HOSPADM

## 2025-05-09 RX ADMIN — SODIUM CHLORIDE 1000 MG: 9 INJECTION, SOLUTION INTRAVENOUS at 10:11

## 2025-05-09 RX ADMIN — SODIUM CHLORIDE 25 ML/HR: 9 INJECTION, SOLUTION INTRAVENOUS at 10:12

## 2025-05-09 ASSESSMENT — PAIN SCALES - GENERAL: PAINLEVEL_OUTOF10: 4

## 2025-05-09 ASSESSMENT — PAIN DESCRIPTION - LOCATION: LOCATION: BACK

## 2025-05-09 ASSESSMENT — PAIN DESCRIPTION - PAIN TYPE: TYPE: CHRONIC PAIN

## 2025-05-09 ASSESSMENT — PAIN DESCRIPTION - ORIENTATION: ORIENTATION: LOWER

## 2025-05-09 NOTE — PROGRESS NOTES
[facility] Progress Note    Date: May 9, 2025    Name: Miguel Carroll    MRN: [id number]         : 1975    Mr. Carroll Arrived ambulatory with rollator and in no distress for Solu-medrol day  Regimen.  Assessment was completed, no acute issues at this time, no new complaints voiced. Peripheral IV placed in right AC with positive blood return.     Criteria met for today's treatment.     Mr. Carroll's vitals were reviewed.  Vitals:    25 0935   BP: (!) 169/93   Pulse: 75   Resp: 18   Temp: 97.4 °F (36.3 °C)   SpO2: 98%       No labs ordered for today    Medications:  Medications Administered         0.9 % sodium chloride infusion Admin Date  2025 Action  New Bag Dose  25 mL/hr Rate  25 mL/hr Route  IntraVENous Documented By  Addie Putnam, RN        methylPREDNISolone (Solu-MEDROL) 1000 mg in 100 mL NS IVPB (addEASE) Admin Date  2025 Action  New Bag Dose  1,000 mg Rate  100 mL/hr Route  IntraVENous Documented By  Addie Putnam, RN             Mr. Carroll tolerated treatment well and [facility]. PIV taken out with no issues, pressure applied, wrapped in 2x2 gauze and coban. He is to return on     Future Appointments   Date Time Provider Department Center   2025  2:00 PM [facility] MRI 1 [facility]   2025  2:45 PM [facility] MRI 1 [facility]   2025  3:30 PM [facility] MRI 1 [facility]   2025 11:00 AM Vanita Urbina PA-C [id number] BS ECC DEP   2025  9:00 AM Barrie Mills MD [id number] BS AMB   2025  1:20 PM Barrie Mills MD [id number] BS AMB

## 2025-05-11 ENCOUNTER — HOSPITAL ENCOUNTER (EMERGENCY)
Facility: HOSPITAL | Age: 50
Discharge: HOME | End: 2025-05-11
Attending: EMERGENCY MEDICINE
Payer: MEDICAID

## 2025-05-11 ENCOUNTER — APPOINTMENT (OUTPATIENT)
Facility: HOSPITAL | Age: 50
End: 2025-05-11
Payer: MEDICAID

## 2025-05-11 VITALS
RESPIRATION RATE: 18 BRPM | HEART RATE: 69 BPM | HEIGHT: 73 IN | WEIGHT: 180 LBS | DIASTOLIC BLOOD PRESSURE: 97 MMHG | BODY MASS INDEX: 23.86 KG/M2 | SYSTOLIC BLOOD PRESSURE: 143 MMHG | TEMPERATURE: 97.9 F | OXYGEN SATURATION: 98 %

## 2025-05-11 DIAGNOSIS — M54.50 ACUTE ON CHRONIC LOW BACK PAIN: Primary | ICD-10-CM

## 2025-05-11 DIAGNOSIS — G89.29 ACUTE ON CHRONIC LOW BACK PAIN: Primary | ICD-10-CM

## 2025-05-11 LAB
ALBUMIN SERPL-MCNC: 3.3 G/DL (ref 3.5–5)
ALBUMIN/GLOB SERPL: 0.9 (ref 1.1–2.2)
ALP SERPL-CCNC: 73 U/L (ref 45–117)
ALT SERPL-CCNC: 54 U/L (ref 12–78)
ANION GAP SERPL CALC-SCNC: 4 MMOL/L (ref 2–12)
APPEARANCE UR: CLEAR
AST SERPL W P-5'-P-CCNC: 34 U/L (ref 15–37)
BACTERIA URNS QL MICRO: ABNORMAL /HPF
BASOPHILS # BLD: 0 K/UL (ref 0–0.1)
BASOPHILS NFR BLD: 0 % (ref 0–1)
BILIRUB SERPL-MCNC: 0.7 MG/DL (ref 0.2–1)
BILIRUB UR QL: NEGATIVE
BUN SERPL-MCNC: 19 MG/DL (ref 6–20)
BUN/CREAT SERPL: 24 (ref 12–20)
CA-I BLD-MCNC: 8.5 MG/DL (ref 8.5–10.1)
CHLORIDE SERPL-SCNC: 99 MMOL/L (ref 97–108)
CO2 SERPL-SCNC: 36 MMOL/L (ref 21–32)
COLOR UR: YELLOW
CREAT SERPL-MCNC: 0.8 MG/DL (ref 0.7–1.3)
DIFFERENTIAL METHOD BLD: ABNORMAL
EOSINOPHIL # BLD: 0.02 K/UL (ref 0–0.4)
EOSINOPHIL NFR BLD: 0.2 % (ref 0–7)
EPITH CASTS URNS QL MICRO: ABNORMAL /LPF
ERYTHROCYTE [DISTWIDTH] IN BLOOD BY AUTOMATED COUNT: 13.5 % (ref 11.5–14.5)
GLOBULIN SER CALC-MCNC: 3.5 G/DL (ref 2–4)
GLUCOSE BLD STRIP.AUTO-MCNC: 145 MG/DL (ref 65–100)
GLUCOSE SERPL-MCNC: 132 MG/DL (ref 65–100)
GLUCOSE UR STRIP.AUTO-MCNC: NEGATIVE MG/DL
HCT VFR BLD AUTO: 43.7 % (ref 36.6–50.3)
HGB BLD-MCNC: 14.6 G/DL (ref 12.1–17)
HGB UR QL STRIP: NEGATIVE
IMM GRANULOCYTES # BLD AUTO: 0.02 K/UL (ref 0–0.04)
IMM GRANULOCYTES NFR BLD AUTO: 0.2 % (ref 0–0.5)
KETONES UR QL STRIP.AUTO: NEGATIVE MG/DL
LEUKOCYTE ESTERASE UR QL STRIP.AUTO: NEGATIVE
LYMPHOCYTES # BLD: 3.48 K/UL (ref 0.8–3.5)
LYMPHOCYTES NFR BLD: 35.5 % (ref 12–49)
MAGNESIUM SERPL-MCNC: 2.1 MG/DL (ref 1.6–2.4)
MCH RBC QN AUTO: 26.5 PG (ref 26–34)
MCHC RBC AUTO-ENTMCNC: 33.4 G/DL (ref 30–36.5)
MCV RBC AUTO: 79.5 FL (ref 80–99)
MONOCYTES # BLD: 0.51 K/UL (ref 0–1)
MONOCYTES NFR BLD: 5.2 % (ref 5–13)
NEUTS SEG # BLD: 5.77 K/UL (ref 1.8–8)
NEUTS SEG NFR BLD: 58.9 % (ref 32–75)
NITRITE UR QL STRIP.AUTO: NEGATIVE
PERFORMED BY:: ABNORMAL
PH UR STRIP: 6 (ref 5–8)
PLATELET # BLD AUTO: 249 K/UL (ref 150–400)
PMV BLD AUTO: 10.8 FL (ref 8.9–12.9)
POTASSIUM SERPL-SCNC: 4.3 MMOL/L (ref 3.5–5.1)
PROT SERPL-MCNC: 6.8 G/DL (ref 6.4–8.2)
PROT UR STRIP-MCNC: NEGATIVE MG/DL
RBC # BLD AUTO: 5.5 M/UL (ref 4.1–5.7)
RBC #/AREA URNS HPF: ABNORMAL /HPF (ref 0–5)
SODIUM SERPL-SCNC: 139 MMOL/L (ref 136–145)
SP GR UR REFRACTOMETRY: 1.01 (ref 1–1.03)
TROPONIN I SERPL HS-MCNC: 9 NG/L (ref 0–76)
URINE CULTURE IF INDICATED: ABNORMAL
UROBILINOGEN UR QL STRIP.AUTO: 0.1 EU/DL (ref 0.2–1)
WBC # BLD AUTO: 9.8 K/UL (ref 4.1–11.1)
WBC URNS QL MICRO: ABNORMAL /HPF (ref 0–4)

## 2025-05-11 PROCEDURE — 85025 COMPLETE CBC W/AUTO DIFF WBC: CPT

## 2025-05-11 PROCEDURE — 74176 CT ABD & PELVIS W/O CONTRAST: CPT

## 2025-05-11 PROCEDURE — 80053 COMPREHEN METABOLIC PANEL: CPT

## 2025-05-11 PROCEDURE — 84484 ASSAY OF TROPONIN QUANT: CPT

## 2025-05-11 PROCEDURE — 87086 URINE CULTURE/COLONY COUNT: CPT

## 2025-05-11 PROCEDURE — 70450 CT HEAD/BRAIN W/O DYE: CPT

## 2025-05-11 PROCEDURE — 71045 X-RAY EXAM CHEST 1 VIEW: CPT

## 2025-05-11 PROCEDURE — 93005 ELECTROCARDIOGRAM TRACING: CPT | Performed by: EMERGENCY MEDICINE

## 2025-05-11 PROCEDURE — 82962 GLUCOSE BLOOD TEST: CPT

## 2025-05-11 PROCEDURE — 99285 EMERGENCY DEPT VISIT HI MDM: CPT

## 2025-05-11 PROCEDURE — 83735 ASSAY OF MAGNESIUM: CPT

## 2025-05-11 PROCEDURE — 2500000003 HC RX 250 WO HCPCS: Performed by: EMERGENCY MEDICINE

## 2025-05-11 PROCEDURE — 6360000002 HC RX W HCPCS: Performed by: EMERGENCY MEDICINE

## 2025-05-11 PROCEDURE — 96375 TX/PRO/DX INJ NEW DRUG ADDON: CPT

## 2025-05-11 PROCEDURE — 2580000003 HC RX 258: Performed by: EMERGENCY MEDICINE

## 2025-05-11 PROCEDURE — 96374 THER/PROPH/DIAG INJ IV PUSH: CPT

## 2025-05-11 PROCEDURE — 81001 URINALYSIS AUTO W/SCOPE: CPT

## 2025-05-11 RX ORDER — HYDROCODONE BITARTRATE AND ACETAMINOPHEN 5; 325 MG/1; MG/1
1 TABLET ORAL EVERY 6 HOURS PRN
Qty: 10 TABLET | Refills: 0 | Status: SHIPPED | OUTPATIENT
Start: 2025-05-11 | End: 2025-05-14

## 2025-05-11 RX ORDER — HYDROMORPHONE HYDROCHLORIDE 1 MG/ML
1 INJECTION, SOLUTION INTRAMUSCULAR; INTRAVENOUS; SUBCUTANEOUS
Refills: 0 | Status: COMPLETED | OUTPATIENT
Start: 2025-05-11 | End: 2025-05-11

## 2025-05-11 RX ORDER — ONDANSETRON 2 MG/ML
4 INJECTION INTRAMUSCULAR; INTRAVENOUS ONCE
Status: COMPLETED | OUTPATIENT
Start: 2025-05-11 | End: 2025-05-11

## 2025-05-11 RX ORDER — 0.9 % SODIUM CHLORIDE 0.9 %
1000 INTRAVENOUS SOLUTION INTRAVENOUS ONCE
Status: COMPLETED | OUTPATIENT
Start: 2025-05-11 | End: 2025-05-11

## 2025-05-11 RX ADMIN — HYDROMORPHONE HYDROCHLORIDE 1 MG: 1 INJECTION, SOLUTION INTRAMUSCULAR; INTRAVENOUS; SUBCUTANEOUS at 20:44

## 2025-05-11 RX ADMIN — ONDANSETRON 4 MG: 2 INJECTION, SOLUTION INTRAMUSCULAR; INTRAVENOUS at 20:43

## 2025-05-11 RX ADMIN — SODIUM CHLORIDE 1000 ML: 0.9 INJECTION, SOLUTION INTRAVENOUS at 20:51

## 2025-05-11 ASSESSMENT — PAIN SCALES - GENERAL
PAINLEVEL_OUTOF10: 4
PAINLEVEL_OUTOF10: 6
PAINLEVEL_OUTOF10: 0

## 2025-05-11 ASSESSMENT — LIFESTYLE VARIABLES
HOW OFTEN DO YOU HAVE A DRINK CONTAINING ALCOHOL: NEVER
HOW MANY STANDARD DRINKS CONTAINING ALCOHOL DO YOU HAVE ON A TYPICAL DAY: PATIENT DOES NOT DRINK

## 2025-05-11 ASSESSMENT — PAIN - FUNCTIONAL ASSESSMENT
PAIN_FUNCTIONAL_ASSESSMENT: 0-10
PAIN_FUNCTIONAL_ASSESSMENT: 0-10

## 2025-05-11 NOTE — ED TRIAGE NOTES
Pt states hx of MS, is experiencing dizziness, nerve pain, earlier was confused, did not know what day it is and was not answering questions appropriately. Currently is still having pain and dizziness. AxO x4

## 2025-05-12 LAB
EKG ATRIAL RATE: 89 BPM
EKG DIAGNOSIS: NORMAL
EKG P AXIS: 66 DEGREES
EKG P-R INTERVAL: 120 MS
EKG Q-T INTERVAL: 362 MS
EKG QRS DURATION: 88 MS
EKG QTC CALCULATION (BAZETT): 440 MS
EKG R AXIS: 80 DEGREES
EKG T AXIS: 43 DEGREES
EKG VENTRICULAR RATE: 89 BPM

## 2025-05-12 NOTE — DISCHARGE INSTRUCTIONS
Thank you for choosing our Emergency Department for your care.  It is our privilege to care for you in your time of need.  In the next several days, you may receive a survey via email or mailed to your home about your experience with our team.  We would greatly appreciate you taking a few minutes to complete the survey, as we use this information to learn what we have done well and what we could be doing better. Thank you for trusting us with your care!    Below you will find a list of your tests from today's visit.   Labs and Radiology Studies  Recent Results (from the past 12 hours)   POCT Glucose    Collection Time: 05/11/25  8:00 PM   Result Value Ref Range    POC Glucose 145 (H) 65 - 100 mg/dL    Performed by: Milton Amanda    CBC with Auto Differential    Collection Time: 05/11/25  8:50 PM   Result Value Ref Range    WBC 9.8 4.1 - 11.1 K/uL    RBC 5.50 4.10 - 5.70 M/uL    Hemoglobin 14.6 12.1 - 17.0 g/dL    Hematocrit 43.7 36.6 - 50.3 %    MCV 79.5 (L) 80.0 - 99.0 FL    MCH 26.5 26.0 - 34.0 PG    MCHC 33.4 30.0 - 36.5 g/dL    RDW 13.5 11.5 - 14.5 %    Platelets 249 150 - 400 K/uL    MPV 10.8 8.9 - 12.9 FL    Neutrophils % 58.9 32.0 - 75.0 %    Lymphocytes % 35.5 12.0 - 49.0 %    Monocytes % 5.2 5.0 - 13.0 %    Eosinophils % 0.2 0.0 - 7.0 %    Basophils % 0.0 0.0 - 1.0 %    Immature Granulocytes % 0.2 0.0 - 0.5 %    Neutrophils Absolute 5.77 1.80 - 8.00 K/UL    Lymphocytes Absolute 3.48 0.80 - 3.50 K/UL    Monocytes Absolute 0.51 0.00 - 1.00 K/UL    Eosinophils Absolute 0.02 0.00 - 0.40 K/UL    Basophils Absolute 0.00 0.00 - 0.10 K/UL    Immature Granulocytes Absolute 0.02 0.00 - 0.04 K/UL    Differential Type AUTOMATED     Comprehensive Metabolic Panel    Collection Time: 05/11/25  8:50 PM   Result Value Ref Range    Sodium 139 136 - 145 mmol/L    Potassium 4.3 3.5 - 5.1 mmol/L    Chloride 99 97 - 108 mmol/L    CO2 36 (H) 21 - 32 mmol/L    Anion Gap 4 2 - 12 mmol/L    Glucose 132 (H) 65 - 100 mg/dL    BUN

## 2025-05-12 NOTE — ED PROVIDER NOTES
[facility] EMERGENCY DEPT  EMERGENCY DEPARTMENT HISTORY AND PHYSICAL EXAM      Date of evaluation: 5/11/2025  Patient Name: Miguel Carroll  Birthdate 1975  MRN: [id number]  ED Provider: Olena Keys MD   Note Started: 8:06 PM EDT 5/11/25    HISTORY OF PRESENT ILLNESS     Chief Complaint   Patient presents with    Pain    Dizziness       History Provided By: Patient, spouse     HPI: Miguel Carroll is a 49 y.o. male patient with a history of MS.  Chronic back pain.  Patient is seeing his neurologist recently and with his ongoing back pain they decided to do 5 days of 1000 mg Solu-Medrol injections.  In the past this is caused dizziness with high glucose.  Patient now is having increasing pain increasing confusion increasing dizziness most recent injection #5 was on the ninth.  Today he noted to family that he was having severe pain he was off confused after returning from [facility] with some disorientation.  He does endorse dizziness but clarification notes it was going on all day yesterday as well.  Patient is complain of severe 10 out of 10 pain in his lower back without radiation to his legs.    PAST MEDICAL HISTORY   Past Medical History:  Past Medical History:   Diagnosis Date    Autoimmune disease 8/2014    Multiple sclerosis    BPH (benign prostatic hyperplasia)     DM (diabetes mellitus) ([facility]) 6/23/2011    HTN (hypertension) 6/23/2011    Hypercholesterolemia     Morbid obesity (HCC) 05/07/2024    Obesity        Past Surgical History:  Past Surgical History:   Procedure Laterality Date    COLONOSCOPY      ORTHOPEDIC SURGERY  2010    Right knee surgery, knee replacement    MALIK-EN-Y GASTRIC BYPASS N/A 6/27/2024    ROBOTIC SLEEVE GASTRECTOMY SINGLE ANASTOMOSIS DUODENAL ILEOSTOMY (E R A S) performed by Ravi Tobin MD at [facility] MAIN OR       Family History:  Family History   Problem Relation Age of Onset    Hypertension Mother     Breast Cancer Mother     Uterine Cancer Mother     High Blood Pressure Mother

## 2025-05-13 LAB
BACTERIA SPEC CULT: NORMAL
Lab: NORMAL

## 2025-05-14 ENCOUNTER — HOSPITAL ENCOUNTER (OUTPATIENT)
Facility: HOSPITAL | Age: 50
Discharge: HOME | End: 2025-05-17
Attending: PSYCHIATRY & NEUROLOGY
Payer: MEDICAID

## 2025-05-14 DIAGNOSIS — G35 MULTIPLE SCLEROSIS (HCC): ICD-10-CM

## 2025-05-14 DIAGNOSIS — R53.1 WEAKNESS: ICD-10-CM

## 2025-05-14 DIAGNOSIS — G89.29 CHRONIC RIGHT-SIDED LOW BACK PAIN WITH RIGHT-SIDED SCIATICA: ICD-10-CM

## 2025-05-14 DIAGNOSIS — M54.41 CHRONIC RIGHT-SIDED LOW BACK PAIN WITH RIGHT-SIDED SCIATICA: ICD-10-CM

## 2025-05-14 PROCEDURE — 6360000004 HC RX CONTRAST MEDICATION: Performed by: PSYCHIATRY & NEUROLOGY

## 2025-05-14 PROCEDURE — 72156 MRI NECK SPINE W/O & W/DYE: CPT

## 2025-05-14 PROCEDURE — 70553 MRI BRAIN STEM W/O & W/DYE: CPT

## 2025-05-14 PROCEDURE — A9579 GAD-BASE MR CONTRAST NOS,1ML: HCPCS | Performed by: PSYCHIATRY & NEUROLOGY

## 2025-05-14 PROCEDURE — 72157 MRI CHEST SPINE W/O & W/DYE: CPT

## 2025-05-14 RX ADMIN — GADOTERIDOL 17 ML: 279.3 INJECTION, SOLUTION INTRAVENOUS at 15:20

## 2025-05-21 ENCOUNTER — RESULTS FOLLOW-UP (OUTPATIENT)
Age: 50
End: 2025-05-21

## 2025-06-30 SDOH — HEALTH STABILITY: PHYSICAL HEALTH: ON AVERAGE, HOW MANY DAYS PER WEEK DO YOU ENGAGE IN MODERATE TO STRENUOUS EXERCISE (LIKE A BRISK WALK)?: 2 DAYS

## 2025-06-30 SDOH — HEALTH STABILITY: PHYSICAL HEALTH: ON AVERAGE, HOW MANY MINUTES DO YOU ENGAGE IN EXERCISE AT THIS LEVEL?: 30 MIN

## 2025-07-01 ENCOUNTER — OFFICE VISIT (OUTPATIENT)
Facility: CLINIC | Age: 50
End: 2025-07-01
Payer: MEDICAID

## 2025-07-01 VITALS
OXYGEN SATURATION: 96 % | WEIGHT: 202 LBS | RESPIRATION RATE: 16 BRPM | BODY MASS INDEX: 26.77 KG/M2 | HEIGHT: 73 IN | HEART RATE: 74 BPM | TEMPERATURE: 98.1 F | SYSTOLIC BLOOD PRESSURE: 150 MMHG | DIASTOLIC BLOOD PRESSURE: 92 MMHG

## 2025-07-01 DIAGNOSIS — G35 MULTIPLE SCLEROSIS (HCC): ICD-10-CM

## 2025-07-01 DIAGNOSIS — F03.B0 MODERATE DEMENTIA, UNSPECIFIED DEMENTIA TYPE, UNSPECIFIED WHETHER BEHAVIORAL, PSYCHOTIC, OR MOOD DISTURBANCE OR ANXIETY (HCC): ICD-10-CM

## 2025-07-01 DIAGNOSIS — E78.00 PURE HYPERCHOLESTEROLEMIA, UNSPECIFIED: ICD-10-CM

## 2025-07-01 DIAGNOSIS — E87.6 HYPOKALEMIA: ICD-10-CM

## 2025-07-01 DIAGNOSIS — I10 ESSENTIAL HYPERTENSION: ICD-10-CM

## 2025-07-01 DIAGNOSIS — Z76.89 ENCOUNTER TO ESTABLISH CARE: Primary | ICD-10-CM

## 2025-07-01 DIAGNOSIS — Z86.39 HISTORY OF DIABETES MELLITUS: ICD-10-CM

## 2025-07-01 DIAGNOSIS — E55.9 VITAMIN D DEFICIENCY: ICD-10-CM

## 2025-07-01 DIAGNOSIS — Z76.89 ENCOUNTER TO ESTABLISH CARE: ICD-10-CM

## 2025-07-01 PROCEDURE — 3077F SYST BP >= 140 MM HG: CPT

## 2025-07-01 PROCEDURE — 3080F DIAST BP >= 90 MM HG: CPT

## 2025-07-01 PROCEDURE — 99203 OFFICE O/P NEW LOW 30 MIN: CPT

## 2025-07-01 NOTE — PROGRESS NOTES
Have you been to the ER, urgent care clinic since your last visit?  Hospitalized since your last visit?   YES - When: approximately 2 months ago.  Where and Why: [facility] for [facility].    Have you seen or consulted any other health care providers outside our system since your last visit?   NO    “Have you had a diabetic eye exam?”    NO     Date of last diabetic eye exam: 2/13/2014     Chief Complaint   Patient presents with    Established New Doctor     BP (!) 162/92 (BP Site: Right Upper Arm, Patient Position: Sitting, BP Cuff Size: Large Adult)   Pulse 74   Temp 98.1 °F (36.7 °C) (Skin)   Resp 16   Ht 1.854 m (6' 1\")   Wt 91.6 kg (202 lb)   SpO2 96%   BMI 26.65 kg/m²

## 2025-07-01 NOTE — PROGRESS NOTES
Miguel Carroll  49 y.o. male  1975  [id number]  [facility] [address]  [phone]     [place] [facility]: Progress Note       Encounter Date: 7/1/2025    Patient presents with the following chief complaint(s)    Chief Complaint   Patient presents with    Established New Doctor        History provided by patient    Assessment and Plan:   1. Encounter to establish care  -     Vitamin D 25 Hydroxy; Future  -     Hemoglobin A1C; Future  -     Lipid Panel; Future  -     CBC; Future  -     Comprehensive Metabolic Panel; Future  2. Essential hypertension  Comments:  Elevated today.  Not currently on medications.  Labs ordered.  Follow-up in 3 weeks BP check  Orders:  -     CBC; Future  -     Comprehensive Metabolic Panel; Future  3. History of diabetes mellitus  Comments:  Labs ordered.  Orders:  -     Hemoglobin A1C; Future  -     Comprehensive Metabolic Panel; Future  4. Multiple sclerosis (HCC)  Comments:  Under care of neurology, patient to consider discussing option of tapering off gabapentin with neurology  5. Vitamin D deficiency  Comments:  Labs ordered.  Orders:  -     Vitamin D 25 Hydroxy; Future  6. Hypokalemia  Comments:  Labs ordered.  Orders:  -     Comprehensive Metabolic Panel; Future  7. Pure hypercholesterolemia, unspecified  Comments:  Labs ordered.  Orders:  -     Lipid Panel; Future  8. Moderate dementia, unspecified dementia type, unspecified whether behavioral, psychotic, or mood disturbance or anxiety (HCC)  Comments:  Under the care of of neurology and neuropsychology         Return in about 3 weeks (around 7/22/2025) for NV BP check.    History of Present Illness:    Miguel Carroll is a 49 y.o. male with past medical history listed, who presents to clinic today as a new patient to me for a follow up visit.     Has previously seen WE[facility] Flores, last seen March.    Patient has a history of MS, currently under care of neurology

## 2025-07-02 ASSESSMENT — ENCOUNTER SYMPTOMS
ABDOMINAL DISTENTION: 0
NAUSEA: 0
COUGH: 0
WHEEZING: 0
CHEST TIGHTNESS: 0
DIARRHEA: 0
VOMITING: 0
ABDOMINAL PAIN: 0
SHORTNESS OF BREATH: 0

## 2025-07-22 ENCOUNTER — CLINICAL SUPPORT (OUTPATIENT)
Facility: CLINIC | Age: 50
End: 2025-07-22
Payer: MEDICAID

## 2025-07-22 VITALS — SYSTOLIC BLOOD PRESSURE: 144 MMHG | DIASTOLIC BLOOD PRESSURE: 94 MMHG

## 2025-07-22 DIAGNOSIS — I10 ESSENTIAL HYPERTENSION: Primary | ICD-10-CM

## 2025-07-22 LAB
25(OH)D3+25(OH)D2 SERPL-MCNC: 29 NG/ML (ref 30–100)
ALBUMIN SERPL-MCNC: 4.5 G/DL (ref 4.1–5.1)
ALP SERPL-CCNC: 89 IU/L (ref 44–121)
ALT SERPL-CCNC: 35 IU/L (ref 0–44)
AST SERPL-CCNC: 23 IU/L (ref 0–40)
BILIRUB SERPL-MCNC: 0.6 MG/DL (ref 0–1.2)
BUN SERPL-MCNC: 13 MG/DL (ref 6–24)
BUN/CREAT SERPL: 16 (ref 9–20)
CALCIUM SERPL-MCNC: 9.4 MG/DL (ref 8.7–10.2)
CHLORIDE SERPL-SCNC: 102 MMOL/L (ref 96–106)
CHOLEST SERPL-MCNC: 209 MG/DL (ref 100–199)
CO2 SERPL-SCNC: 24 MMOL/L (ref 20–29)
CREAT SERPL-MCNC: 0.82 MG/DL (ref 0.76–1.27)
EGFRCR SERPLBLD CKD-EPI 2021: 108 ML/MIN/1.73
ERYTHROCYTE [DISTWIDTH] IN BLOOD BY AUTOMATED COUNT: 14.5 % (ref 11.6–15.4)
GLOBULIN SER CALC-MCNC: 2.2 G/DL (ref 1.5–4.5)
GLUCOSE SERPL-MCNC: 82 MG/DL (ref 70–99)
HCT VFR BLD AUTO: 41.5 % (ref 37.5–51)
HDLC SERPL-MCNC: 67 MG/DL
HGB BLD-MCNC: 13.5 G/DL (ref 13–17.7)
LDLC SERPL CALC-MCNC: 133 MG/DL (ref 0–99)
MCH RBC QN AUTO: 27.6 PG (ref 26.6–33)
MCHC RBC AUTO-ENTMCNC: 32.5 G/DL (ref 31.5–35.7)
MCV RBC AUTO: 85 FL (ref 79–97)
PLATELET # BLD AUTO: 237 X10E3/UL (ref 150–450)
POTASSIUM SERPL-SCNC: 3.7 MMOL/L (ref 3.5–5.2)
PROT SERPL-MCNC: 6.7 G/DL (ref 6–8.5)
RBC # BLD AUTO: 4.89 X10E6/UL (ref 4.14–5.8)
SODIUM SERPL-SCNC: 141 MMOL/L (ref 134–144)
TRIGL SERPL-MCNC: 52 MG/DL (ref 0–149)
VLDLC SERPL CALC-MCNC: 9 MG/DL (ref 5–40)
WBC # BLD AUTO: 5.1 X10E3/UL (ref 3.4–10.8)

## 2025-07-22 PROCEDURE — 3080F DIAST BP >= 90 MM HG: CPT

## 2025-07-22 PROCEDURE — 3077F SYST BP >= 140 MM HG: CPT

## 2025-07-22 PROCEDURE — 99213 OFFICE O/P EST LOW 20 MIN: CPT

## 2025-07-22 RX ORDER — OLMESARTAN MEDOXOMIL 40 MG/1
40 TABLET ORAL DAILY
Qty: 30 TABLET | Refills: 1 | Status: SHIPPED | OUTPATIENT
Start: 2025-07-22

## 2025-07-22 NOTE — PROGRESS NOTES
Miguel Carroll  49 y.o. male  1975  [address]  [facility] [address]  [id number]     [place] [facility]: Progress Note       Encounter Date: 7/22/2025  History provided by patient  History of Present Illness   Miguel Carroll is a 49 y.o. male who presents today for blood pressure check.   Previously, patient's last BP was 150/92 at last appointment.  Today, the blood pressure reading is 144/94.    The patient reports that he is currently not taking any medications for blood pressure control.  He has been on HCTZ, amlodipine and olmesartan in the past.   Patient denies headache, chest pain/TIA, blurry vision.       Vitals:     Vitals:    07/22/25 0911 07/22/25 0923   BP: (!) 150/98 (!) 144/94   BP Site: Left Upper Arm Left Upper Arm   Patient Position: Sitting Sitting   BP Cuff Size: Large Adult Large Adult     There is no height or weight on file to calculate BMI.    Wt Readings from Last 3 Encounters:   07/01/25 91.6 kg (202 lb)   05/11/25 81.6 kg (180 lb)   05/09/25 91.3 kg (201 lb 4.8 oz)       Physical Exam:     Physical Exam  Vitals reviewed.   Constitutional:       Appearance: Normal appearance.   HENT:      Head: Normocephalic and atraumatic.   Eyes:      Extraocular Movements: Extraocular movements intact.      Pupils: Pupils are equal, round, and reactive to light.   Musculoskeletal:         General: Normal range of motion.   Skin:     General: Skin is warm and dry.   Neurological:      General: No focal deficit present.      Mental Status: He is alert and oriented to person, place, and time.   Psychiatric:         Mood and Affect: Mood normal.         Behavior: Behavior normal.         Thought Content: Thought content normal.         Judgment: Judgment normal.          Assessment and Plan:   1. Essential hypertension  Comments:  Chronic. uncontrolled. Start olmesartan daily. f/u 4 weeks bp check  Orders:  -     olmesartan (BENICAR) 40 MG tablet;

## 2025-07-22 NOTE — PROGRESS NOTES
Chief Complaint   Patient presents with    Other     Patient here for bp check      BP (!) 150/98 (BP Site: Left Upper Arm, Patient Position: Sitting, BP Cuff Size: Large Adult)

## 2025-07-24 LAB
EST. AVERAGE GLUCOSE BLD GHB EST-MCNC: 108 MG/DL
HBA1C MFR BLD: 5.4 %HB

## 2025-08-14 ENCOUNTER — OFFICE VISIT (OUTPATIENT)
Age: 50
End: 2025-08-14
Payer: MEDICAID

## 2025-08-14 VITALS
OXYGEN SATURATION: 97 % | HEART RATE: 96 BPM | SYSTOLIC BLOOD PRESSURE: 140 MMHG | BODY MASS INDEX: 26.65 KG/M2 | TEMPERATURE: 96.9 F | HEIGHT: 73 IN | DIASTOLIC BLOOD PRESSURE: 80 MMHG

## 2025-08-14 DIAGNOSIS — G35 MULTIPLE SCLEROSIS (HCC): Primary | ICD-10-CM

## 2025-08-14 DIAGNOSIS — R53.1 WEAKNESS: ICD-10-CM

## 2025-08-14 PROCEDURE — 3077F SYST BP >= 140 MM HG: CPT | Performed by: PSYCHIATRY & NEUROLOGY

## 2025-08-14 PROCEDURE — 99214 OFFICE O/P EST MOD 30 MIN: CPT | Performed by: PSYCHIATRY & NEUROLOGY

## 2025-08-14 PROCEDURE — 3079F DIAST BP 80-89 MM HG: CPT | Performed by: PSYCHIATRY & NEUROLOGY

## 2025-08-19 ENCOUNTER — CLINICAL SUPPORT (OUTPATIENT)
Facility: CLINIC | Age: 50
End: 2025-08-19

## 2025-08-19 VITALS — SYSTOLIC BLOOD PRESSURE: 130 MMHG | DIASTOLIC BLOOD PRESSURE: 68 MMHG

## (undated) DEVICE — Device

## (undated) DEVICE — SUTURE PDS + SZ 0 L27IN ABSRB VLT L36MM CT 1 1 2 CIR PDP340H

## (undated) DEVICE — BLADELESS OBTURATOR: Brand: WECK VISTA

## (undated) DEVICE — ELECTRO LUBE IS A SINGLE PATIENT USE DEVICE THAT IS INTENDED TO BE USED ON ELECTROSURGICAL ELECTRODES TO REDUCE STICKING.: Brand: KEY SURGICAL ELECTRO LUBE

## (undated) DEVICE — ENDO CARRY-ON PROCEDURE KIT INCLUDES ENZYMATIC SPONGE, GAUZE, BIOHAZARD LABEL, TRAY, LUBRICANT, DIRTY SCOPE LABEL, WATER LABEL, TRAY, DRAWSTRING PAD, AND DEFENDO 4-PIECE KIT.: Brand: ENDO CARRY-ON PROCEDURE KIT

## (undated) DEVICE — X-RAY DETECTABLE SPONGES,16 PLY: Brand: VISTEC

## (undated) DEVICE — VESSEL SEALER EXTEND: Brand: ENDOWRIST

## (undated) DEVICE — SUTURE MONOCRYL + SZ 4-0 L27IN ABSRB UD L19MM PS-2 3/8 CIR MCP426H

## (undated) DEVICE — SUTURE NONABSORBABLE MONOFILAMENT 2-0 V-20 6 IN V-LOC PBT VLOCN0605

## (undated) DEVICE — LIQUIBAND RAPID ADHESIVE 36/CS 0.8ML: Brand: MEDLINE

## (undated) DEVICE — AIR SHEET,LAT,COMFORT GLIDE, BLEND 40X80: Brand: MEDLINE

## (undated) DEVICE — STAPLER 60 RELOAD BLUE: Brand: SUREFORM

## (undated) DEVICE — STAPLER 60 RELOAD WHITE: Brand: SUREFORM

## (undated) DEVICE — SOLUTION ANTIFOG VIS SYS CLEARIFY LAPSCP

## (undated) DEVICE — GLOVE SURG SZ 65 L12IN FNGR THK94MIL STD WHT LTX FREE

## (undated) DEVICE — GARMENT,MEDLINE,DVT,INT,CALF,MED, GEN2: Brand: MEDLINE

## (undated) DEVICE — VISIGI 3D®  CALIBRATION SYSTEM  SIZE 40FR STD W/ BULB: Brand: BOEHRINGER® VISIGI 3D™ SLEEVE GASTRECTOMY CALIBRATION SYSTEM, SIZE 40FR W/BULB

## (undated) DEVICE — Z DISCONTINUED USE 2220190 SUTURE VICRYL SZ 3-0 L27IN ABSRB UD L26MM SH 1/2 CIR J416H

## (undated) DEVICE — SHEET TRNSF DISPOSABLE HOVERMATT 100 PER CA

## (undated) DEVICE — PACK,BASIC,SIRUS,V: Brand: MEDLINE

## (undated) DEVICE — ARM DRAPE

## (undated) DEVICE — SUTURE MONOCRYL ABSORBABLE L 6 IN SZ 3-0 POLYGLCOLIC ACD MONOFILAMENT SH

## (undated) DEVICE — GLOVE SURG SZ 7 L12IN FNGR THK79MIL GRN LTX FREE

## (undated) DEVICE — NEEDLE SPNL 22GA L3.5IN BLK HUB S STL REG WALL FIT STYL

## (undated) DEVICE — STAPLER SKIN LN REINF 60 MM ECHELON ENDOPATH

## (undated) DEVICE — GENERAL LAPAROSCOPY - SMH: Brand: MEDLINE INDUSTRIES, INC.

## (undated) DEVICE — TIP COVER ACCESSORY

## (undated) DEVICE — SYSTEM EVAC SMOKE LAPARSCOPIC

## (undated) DEVICE — TROCAR ENDOSCP L100MM DIA5MM BLDELSS STBL SL THRD OPT VW

## (undated) DEVICE — SEAL

## (undated) DEVICE — SOLUTION IV 1000ML 0.9% SOD CHL PH 5 INJ USP VIAFLX PLAS